# Patient Record
Sex: FEMALE | Race: WHITE | NOT HISPANIC OR LATINO | URBAN - METROPOLITAN AREA
[De-identification: names, ages, dates, MRNs, and addresses within clinical notes are randomized per-mention and may not be internally consistent; named-entity substitution may affect disease eponyms.]

---

## 2017-03-30 ENCOUNTER — RECORDS - HEALTHEAST (OUTPATIENT)
Dept: LAB | Facility: CLINIC | Age: 37
End: 2017-03-30

## 2017-03-30 LAB
CHOLEST SERPL-MCNC: 151 MG/DL
FASTING STATUS PATIENT QL REPORTED: ABNORMAL
HDLC SERPL-MCNC: 48 MG/DL
LDLC SERPL CALC-MCNC: 91 MG/DL
TRIGL SERPL-MCNC: 58 MG/DL

## 2017-12-21 ENCOUNTER — RECORDS - HEALTHEAST (OUTPATIENT)
Dept: GENERAL RADIOLOGY | Facility: CLINIC | Age: 37
End: 2017-12-21

## 2017-12-21 ENCOUNTER — OFFICE VISIT - HEALTHEAST (OUTPATIENT)
Dept: RHEUMATOLOGY | Facility: CLINIC | Age: 37
End: 2017-12-21

## 2017-12-21 DIAGNOSIS — M25.511 PAIN IN RIGHT SHOULDER: ICD-10-CM

## 2017-12-21 DIAGNOSIS — G89.29 OTHER CHRONIC PAIN: ICD-10-CM

## 2017-12-21 DIAGNOSIS — R07.89 MUSCULOSKELETAL CHEST PAIN: ICD-10-CM

## 2017-12-21 DIAGNOSIS — Z87.2 HISTORY OF PSORIASIS: ICD-10-CM

## 2017-12-21 DIAGNOSIS — M25.651 HIP STIFFNESS, RIGHT: ICD-10-CM

## 2017-12-21 DIAGNOSIS — M54.5 CHRONIC BILATERAL LOW BACK PAIN, WITH SCIATICA PRESENCE UNSPECIFIED: ICD-10-CM

## 2017-12-21 DIAGNOSIS — R07.89 OTHER CHEST PAIN: ICD-10-CM

## 2017-12-21 DIAGNOSIS — M46.1 SACROILIAC INFLAMMATION (H): ICD-10-CM

## 2017-12-21 DIAGNOSIS — M54.50 LOW BACK PAIN: ICD-10-CM

## 2017-12-21 DIAGNOSIS — G89.29 CHRONIC BILATERAL LOW BACK PAIN, WITH SCIATICA PRESENCE UNSPECIFIED: ICD-10-CM

## 2017-12-21 DIAGNOSIS — M25.511 PAIN OF RIGHT STERNOCLAVICULAR JOINT: ICD-10-CM

## 2017-12-21 DIAGNOSIS — H04.123 DRY EYES, BILATERAL: ICD-10-CM

## 2017-12-21 RX ORDER — IBUPROFEN 200 MG
200 TABLET ORAL EVERY 6 HOURS PRN
Status: SHIPPED | COMMUNITY
Start: 2017-12-21

## 2017-12-21 RX ORDER — ACETAMINOPHEN 325 MG/1
650 TABLET ORAL EVERY 6 HOURS PRN
Status: SHIPPED | COMMUNITY
Start: 2017-12-21

## 2017-12-21 ASSESSMENT — MIFFLIN-ST. JEOR: SCORE: 1221.07

## 2017-12-26 ENCOUNTER — AMBULATORY - HEALTHEAST (OUTPATIENT)
Dept: LAB | Facility: CLINIC | Age: 37
End: 2017-12-26

## 2017-12-26 DIAGNOSIS — M25.511 PAIN OF RIGHT STERNOCLAVICULAR JOINT: ICD-10-CM

## 2017-12-26 DIAGNOSIS — Z87.2 HISTORY OF PSORIASIS: ICD-10-CM

## 2017-12-26 DIAGNOSIS — M54.5 CHRONIC BILATERAL LOW BACK PAIN, WITH SCIATICA PRESENCE UNSPECIFIED: ICD-10-CM

## 2017-12-26 DIAGNOSIS — G89.29 CHRONIC BILATERAL LOW BACK PAIN, WITH SCIATICA PRESENCE UNSPECIFIED: ICD-10-CM

## 2017-12-26 DIAGNOSIS — R89.9 ABNORMAL LABORATORY TEST RESULT: ICD-10-CM

## 2017-12-26 DIAGNOSIS — M46.1 SACROILIAC INFLAMMATION (H): ICD-10-CM

## 2017-12-26 DIAGNOSIS — H04.123 DRY EYES, BILATERAL: ICD-10-CM

## 2017-12-26 DIAGNOSIS — R07.89 MUSCULOSKELETAL CHEST PAIN: ICD-10-CM

## 2017-12-26 DIAGNOSIS — M25.651 HIP STIFFNESS, RIGHT: ICD-10-CM

## 2017-12-26 LAB
ALT SERPL W P-5'-P-CCNC: 22 U/L (ref 0–45)
AST SERPL W P-5'-P-CCNC: 22 U/L (ref 0–40)
CREAT SERPL-MCNC: 0.76 MG/DL (ref 0.6–1.1)
GFR SERPL CREATININE-BSD FRML MDRD: >60 ML/MIN/1.73M2

## 2017-12-27 LAB
HCV AB SERPL QL IA: NEGATIVE
HEPATITIS B SURFACE ANTIBODY LHE- HISTORICAL: POSITIVE

## 2017-12-28 LAB
ANA SER QL: 0.7 U
HLA-B27 RESULT - HISTORICAL: NEGATIVE
INTERPRETATION: NORMAL

## 2017-12-29 ENCOUNTER — COMMUNICATION - HEALTHEAST (OUTPATIENT)
Dept: FAMILY MEDICINE | Facility: CLINIC | Age: 37
End: 2017-12-29

## 2017-12-30 LAB — HBV SURFACE AG SERPL QL IA: NEGATIVE

## 2018-01-01 LAB — HBV CORE AB SERPL QL IA: NEGATIVE

## 2018-01-02 ENCOUNTER — COMMUNICATION - HEALTHEAST (OUTPATIENT)
Dept: RHEUMATOLOGY | Facility: CLINIC | Age: 38
End: 2018-01-02

## 2018-01-02 LAB
SS-A/RO AUTOANTIBODIES - HISTORICAL: 3 EU
SS-B/LA AUTOANTIBODIES - HISTORICAL: 0 EU

## 2018-01-04 ENCOUNTER — OFFICE VISIT - HEALTHEAST (OUTPATIENT)
Dept: RHEUMATOLOGY | Facility: CLINIC | Age: 38
End: 2018-01-04

## 2018-01-04 ENCOUNTER — RECORDS - HEALTHEAST (OUTPATIENT)
Dept: GENERAL RADIOLOGY | Facility: CLINIC | Age: 38
End: 2018-01-04

## 2018-01-04 DIAGNOSIS — L40.50 PSORIATIC ARTHRITIS (H): ICD-10-CM

## 2018-01-04 DIAGNOSIS — L40.50 ARTHROPATHIC PSORIASIS, UNSPECIFIED (H): ICD-10-CM

## 2018-01-04 DIAGNOSIS — M54.2 CERVICALGIA: ICD-10-CM

## 2018-01-04 DIAGNOSIS — M25.511 PAIN OF RIGHT STERNOCLAVICULAR JOINT: ICD-10-CM

## 2018-01-04 DIAGNOSIS — G89.29 OTHER CHRONIC PAIN: ICD-10-CM

## 2018-01-04 DIAGNOSIS — M54.2 CHRONIC NECK PAIN: ICD-10-CM

## 2018-01-04 DIAGNOSIS — G89.29 CHRONIC NECK PAIN: ICD-10-CM

## 2018-01-04 DIAGNOSIS — M46.1 BILATERAL SACROILIITIS (H): ICD-10-CM

## 2018-01-04 DIAGNOSIS — Z87.2 HISTORY OF PSORIASIS: ICD-10-CM

## 2018-01-04 ASSESSMENT — MIFFLIN-ST. JEOR: SCORE: 1239.21

## 2018-01-10 ENCOUNTER — COMMUNICATION - HEALTHEAST (OUTPATIENT)
Dept: ADMINISTRATIVE | Facility: CLINIC | Age: 38
End: 2018-01-10

## 2018-01-10 ENCOUNTER — COMMUNICATION - HEALTHEAST (OUTPATIENT)
Dept: RHEUMATOLOGY | Facility: CLINIC | Age: 38
End: 2018-01-10

## 2018-01-10 DIAGNOSIS — R93.89 ABNORMAL CHEST X-RAY: ICD-10-CM

## 2018-01-10 DIAGNOSIS — M47.819 SPONDYLOARTHROPATHY: ICD-10-CM

## 2018-01-10 DIAGNOSIS — L40.50 PSORIATIC ARTHRITIS (H): ICD-10-CM

## 2018-01-15 ENCOUNTER — COMMUNICATION - HEALTHEAST (OUTPATIENT)
Dept: RHEUMATOLOGY | Facility: CLINIC | Age: 38
End: 2018-01-15

## 2018-01-16 ENCOUNTER — COMMUNICATION - HEALTHEAST (OUTPATIENT)
Dept: ADMINISTRATIVE | Facility: CLINIC | Age: 38
End: 2018-01-16

## 2018-01-17 ENCOUNTER — COMMUNICATION - HEALTHEAST (OUTPATIENT)
Dept: RHEUMATOLOGY | Facility: CLINIC | Age: 38
End: 2018-01-17

## 2018-01-18 ENCOUNTER — COMMUNICATION - HEALTHEAST (OUTPATIENT)
Dept: RHEUMATOLOGY | Facility: CLINIC | Age: 38
End: 2018-01-18

## 2018-01-29 ENCOUNTER — COMMUNICATION - HEALTHEAST (OUTPATIENT)
Dept: ENDOCRINOLOGY | Facility: CLINIC | Age: 38
End: 2018-01-29

## 2018-01-31 ENCOUNTER — COMMUNICATION - HEALTHEAST (OUTPATIENT)
Dept: RHEUMATOLOGY | Facility: CLINIC | Age: 38
End: 2018-01-31

## 2018-01-31 DIAGNOSIS — M46.1 BILATERAL SACROILIITIS (H): ICD-10-CM

## 2018-01-31 DIAGNOSIS — R93.7 ABNORMAL X-RAY OF SPINE: ICD-10-CM

## 2018-01-31 DIAGNOSIS — Z87.39 HISTORY OF INFLAMMATORY ARTHRITIS: ICD-10-CM

## 2018-01-31 DIAGNOSIS — R93.89 ABNORMAL X-RAY: ICD-10-CM

## 2018-01-31 DIAGNOSIS — L40.50 PSORIATIC ARTHRITIS (H): ICD-10-CM

## 2018-02-01 ENCOUNTER — AMBULATORY - HEALTHEAST (OUTPATIENT)
Dept: SCHEDULING | Facility: CLINIC | Age: 38
End: 2018-02-01

## 2018-02-01 DIAGNOSIS — M46.1 BILATERAL SACROILIITIS (H): ICD-10-CM

## 2018-02-01 DIAGNOSIS — L40.50 PSORIATIC ARTHRITIS (H): ICD-10-CM

## 2018-02-01 DIAGNOSIS — R93.7 ABNORMAL X-RAY OF SPINE: ICD-10-CM

## 2018-02-07 ENCOUNTER — RECORDS - HEALTHEAST (OUTPATIENT)
Dept: ADMINISTRATIVE | Facility: OTHER | Age: 38
End: 2018-02-07

## 2018-02-07 ENCOUNTER — COMMUNICATION - HEALTHEAST (OUTPATIENT)
Dept: ADMINISTRATIVE | Facility: CLINIC | Age: 38
End: 2018-02-07

## 2018-02-07 ENCOUNTER — COMMUNICATION - HEALTHEAST (OUTPATIENT)
Dept: RHEUMATOLOGY | Facility: CLINIC | Age: 38
End: 2018-02-07

## 2018-02-07 DIAGNOSIS — R93.7 ABNORMAL MRI, THORACIC SPINE: ICD-10-CM

## 2018-02-08 ENCOUNTER — COMMUNICATION - HEALTHEAST (OUTPATIENT)
Dept: RHEUMATOLOGY | Facility: CLINIC | Age: 38
End: 2018-02-08

## 2018-02-09 ENCOUNTER — RECORDS - HEALTHEAST (OUTPATIENT)
Dept: ADMINISTRATIVE | Facility: OTHER | Age: 38
End: 2018-02-09

## 2018-02-13 ENCOUNTER — COMMUNICATION - HEALTHEAST (OUTPATIENT)
Dept: RHEUMATOLOGY | Facility: CLINIC | Age: 38
End: 2018-02-13

## 2018-02-13 DIAGNOSIS — R93.7 ABNORMAL MRI, THORACIC SPINE: ICD-10-CM

## 2018-02-16 ENCOUNTER — HOSPITAL ENCOUNTER (OUTPATIENT)
Dept: NUCLEAR MEDICINE | Facility: HOSPITAL | Age: 38
Discharge: HOME | End: 2018-02-16
Attending: INTERNAL MEDICINE

## 2018-02-16 DIAGNOSIS — R93.7 ABNORMAL MRI, THORACIC SPINE: ICD-10-CM

## 2018-02-19 ENCOUNTER — COMMUNICATION - HEALTHEAST (OUTPATIENT)
Dept: ADMINISTRATIVE | Facility: CLINIC | Age: 38
End: 2018-02-19

## 2018-05-29 ENCOUNTER — COMMUNICATION - HEALTHEAST (OUTPATIENT)
Dept: RHEUMATOLOGY | Facility: CLINIC | Age: 38
End: 2018-05-29

## 2018-06-04 ENCOUNTER — OFFICE VISIT - HEALTHEAST (OUTPATIENT)
Dept: RHEUMATOLOGY | Facility: CLINIC | Age: 38
End: 2018-06-04

## 2018-06-04 DIAGNOSIS — L40.9 PSORIASIS: ICD-10-CM

## 2018-06-04 DIAGNOSIS — Z79.899 HIGH RISK MEDICATION USE: ICD-10-CM

## 2018-06-04 DIAGNOSIS — M46.1 SACROILIAC INFLAMMATION (H): ICD-10-CM

## 2018-06-04 DIAGNOSIS — L40.50 PSORIATIC ARTHRITIS (H): ICD-10-CM

## 2018-06-04 LAB
ALBUMIN SERPL-MCNC: 3.9 G/DL (ref 3.5–5)
ALT SERPL W P-5'-P-CCNC: 25 U/L (ref 0–45)
AST SERPL W P-5'-P-CCNC: 24 U/L (ref 0–40)
BASOPHILS # BLD AUTO: 0 THOU/UL (ref 0–0.2)
BASOPHILS NFR BLD AUTO: 1 % (ref 0–2)
CREAT SERPL-MCNC: 0.85 MG/DL (ref 0.6–1.1)
EOSINOPHIL # BLD AUTO: 0.3 THOU/UL (ref 0–0.4)
EOSINOPHIL NFR BLD AUTO: 5 % (ref 0–6)
ERYTHROCYTE [DISTWIDTH] IN BLOOD BY AUTOMATED COUNT: 11.6 % (ref 11–14.5)
GFR SERPL CREATININE-BSD FRML MDRD: >60 ML/MIN/1.73M2
HCT VFR BLD AUTO: 40.3 % (ref 35–47)
HGB BLD-MCNC: 13.5 G/DL (ref 12–16)
LYMPHOCYTES # BLD AUTO: 2.3 THOU/UL (ref 0.8–4.4)
LYMPHOCYTES NFR BLD AUTO: 32 % (ref 20–40)
MCH RBC QN AUTO: 29.8 PG (ref 27–34)
MCHC RBC AUTO-ENTMCNC: 33.5 G/DL (ref 32–36)
MCV RBC AUTO: 89 FL (ref 80–100)
MONOCYTES # BLD AUTO: 0.5 THOU/UL (ref 0–0.9)
MONOCYTES NFR BLD AUTO: 7 % (ref 2–10)
NEUTROPHILS # BLD AUTO: 4 THOU/UL (ref 2–7.7)
NEUTROPHILS NFR BLD AUTO: 56 % (ref 50–70)
PLATELET # BLD AUTO: 242 THOU/UL (ref 140–440)
PMV BLD AUTO: 7.6 FL (ref 7–10)
RBC # BLD AUTO: 4.53 MILL/UL (ref 3.8–5.4)
WBC: 7.2 THOU/UL (ref 4–11)

## 2018-06-04 RX ORDER — MAGNESIUM 30 MG
30 TABLET ORAL 2 TIMES DAILY
Status: SHIPPED | COMMUNITY
Start: 2018-06-04

## 2018-06-04 ASSESSMENT — MIFFLIN-ST. JEOR: SCORE: 1230.14

## 2018-06-18 ENCOUNTER — COMMUNICATION - HEALTHEAST (OUTPATIENT)
Dept: RHEUMATOLOGY | Facility: CLINIC | Age: 38
End: 2018-06-18

## 2018-06-21 ENCOUNTER — COMMUNICATION - HEALTHEAST (OUTPATIENT)
Dept: ADMINISTRATIVE | Facility: CLINIC | Age: 38
End: 2018-06-21

## 2018-10-04 ENCOUNTER — COMMUNICATION - HEALTHEAST (OUTPATIENT)
Dept: TELEHEALTH | Facility: CLINIC | Age: 38
End: 2018-10-04

## 2018-10-05 ENCOUNTER — COMMUNICATION - HEALTHEAST (OUTPATIENT)
Dept: RHEUMATOLOGY | Facility: CLINIC | Age: 38
End: 2018-10-05

## 2018-10-05 DIAGNOSIS — L40.50 PSORIATIC ARTHRITIS (H): ICD-10-CM

## 2018-10-05 DIAGNOSIS — L40.9 PSORIASIS: ICD-10-CM

## 2018-10-05 DIAGNOSIS — M46.1 SACROILIAC INFLAMMATION (H): ICD-10-CM

## 2018-11-05 ENCOUNTER — COMMUNICATION - HEALTHEAST (OUTPATIENT)
Dept: RHEUMATOLOGY | Facility: CLINIC | Age: 38
End: 2018-11-05

## 2018-11-05 DIAGNOSIS — L40.9 PSORIASIS: ICD-10-CM

## 2018-11-05 DIAGNOSIS — M46.1 SACROILIAC INFLAMMATION (H): ICD-10-CM

## 2018-11-05 DIAGNOSIS — L40.50 PSORIATIC ARTHRITIS (H): ICD-10-CM

## 2018-11-09 ENCOUNTER — COMMUNICATION - HEALTHEAST (OUTPATIENT)
Dept: ADMINISTRATIVE | Facility: CLINIC | Age: 38
End: 2018-11-09

## 2018-12-04 ENCOUNTER — COMMUNICATION - HEALTHEAST (OUTPATIENT)
Dept: ADMINISTRATIVE | Facility: CLINIC | Age: 38
End: 2018-12-04

## 2018-12-17 ENCOUNTER — COMMUNICATION - HEALTHEAST (OUTPATIENT)
Dept: ADMINISTRATIVE | Facility: CLINIC | Age: 38
End: 2018-12-17

## 2018-12-18 ENCOUNTER — COMMUNICATION - HEALTHEAST (OUTPATIENT)
Dept: ADMINISTRATIVE | Facility: CLINIC | Age: 38
End: 2018-12-18

## 2018-12-18 DIAGNOSIS — M46.1 SACROILIAC INFLAMMATION (H): ICD-10-CM

## 2018-12-18 DIAGNOSIS — L40.9 PSORIASIS: ICD-10-CM

## 2018-12-18 DIAGNOSIS — L40.50 PSORIATIC ARTHRITIS (H): ICD-10-CM

## 2018-12-19 ENCOUNTER — COMMUNICATION - HEALTHEAST (OUTPATIENT)
Dept: RHEUMATOLOGY | Facility: CLINIC | Age: 38
End: 2018-12-19

## 2019-01-14 ENCOUNTER — AMBULATORY - HEALTHEAST (OUTPATIENT)
Dept: LAB | Facility: CLINIC | Age: 39
End: 2019-01-14

## 2019-01-14 DIAGNOSIS — L40.50 PSORIATIC ARTHRITIS (H): ICD-10-CM

## 2019-01-14 DIAGNOSIS — Z79.899 HIGH RISK MEDICATION USE: ICD-10-CM

## 2019-01-14 LAB
ALBUMIN SERPL-MCNC: 3.8 G/DL (ref 3.5–5)
ALT SERPL W P-5'-P-CCNC: 13 U/L (ref 0–45)
AST SERPL W P-5'-P-CCNC: 19 U/L (ref 0–40)
CREAT SERPL-MCNC: 0.84 MG/DL (ref 0.6–1.1)
ERYTHROCYTE [DISTWIDTH] IN BLOOD BY AUTOMATED COUNT: 10.9 % (ref 11–14.5)
GFR SERPL CREATININE-BSD FRML MDRD: >60 ML/MIN/1.73M2
HCT VFR BLD AUTO: 40.9 % (ref 35–47)
HGB BLD-MCNC: 13.8 G/DL (ref 12–16)
MCH RBC QN AUTO: 30.4 PG (ref 27–34)
MCHC RBC AUTO-ENTMCNC: 33.7 G/DL (ref 32–36)
MCV RBC AUTO: 90 FL (ref 80–100)
PLATELET # BLD AUTO: 204 THOU/UL (ref 140–440)
PMV BLD AUTO: 8.3 FL (ref 7–10)
RBC # BLD AUTO: 4.53 MILL/UL (ref 3.8–5.4)
WBC: 6 THOU/UL (ref 4–11)

## 2019-01-17 ENCOUNTER — OFFICE VISIT - HEALTHEAST (OUTPATIENT)
Dept: RHEUMATOLOGY | Facility: CLINIC | Age: 39
End: 2019-01-17

## 2019-01-17 DIAGNOSIS — M46.1 BILATERAL SACROILIITIS (H): ICD-10-CM

## 2019-01-17 DIAGNOSIS — L40.9 PSORIASIS: ICD-10-CM

## 2019-01-18 ENCOUNTER — COMMUNICATION - HEALTHEAST (OUTPATIENT)
Dept: RHEUMATOLOGY | Facility: CLINIC | Age: 39
End: 2019-01-18

## 2019-01-29 ENCOUNTER — COMMUNICATION - HEALTHEAST (OUTPATIENT)
Dept: RHEUMATOLOGY | Facility: CLINIC | Age: 39
End: 2019-01-29

## 2019-02-05 ENCOUNTER — COMMUNICATION - HEALTHEAST (OUTPATIENT)
Dept: ADMINISTRATIVE | Facility: CLINIC | Age: 39
End: 2019-02-05

## 2019-02-05 ENCOUNTER — COMMUNICATION - HEALTHEAST (OUTPATIENT)
Dept: RHEUMATOLOGY | Facility: CLINIC | Age: 39
End: 2019-02-05

## 2019-02-05 DIAGNOSIS — L40.50 PSORIATIC ARTHROPATHY (H): ICD-10-CM

## 2019-02-12 ENCOUNTER — COMMUNICATION - HEALTHEAST (OUTPATIENT)
Dept: RHEUMATOLOGY | Facility: CLINIC | Age: 39
End: 2019-02-12

## 2019-02-22 ENCOUNTER — COMMUNICATION - HEALTHEAST (OUTPATIENT)
Dept: ADMINISTRATIVE | Facility: CLINIC | Age: 39
End: 2019-02-22

## 2019-02-28 ENCOUNTER — COMMUNICATION - HEALTHEAST (OUTPATIENT)
Dept: SCHEDULING | Facility: CLINIC | Age: 39
End: 2019-02-28

## 2019-03-13 ENCOUNTER — AMBULATORY - HEALTHEAST (OUTPATIENT)
Dept: RHEUMATOLOGY | Facility: CLINIC | Age: 39
End: 2019-03-13

## 2019-03-13 DIAGNOSIS — L40.50 PSORIATIC ARTHROPATHY (H): ICD-10-CM

## 2019-04-10 ENCOUNTER — AMBULATORY - HEALTHEAST (OUTPATIENT)
Dept: RHEUMATOLOGY | Facility: CLINIC | Age: 39
End: 2019-04-10

## 2019-04-10 DIAGNOSIS — L40.50 PSORIATIC ARTHROPATHY (H): ICD-10-CM

## 2019-04-12 ENCOUNTER — AMBULATORY - HEALTHEAST (OUTPATIENT)
Dept: RHEUMATOLOGY | Facility: CLINIC | Age: 39
End: 2019-04-12

## 2019-04-30 ENCOUNTER — COMMUNICATION - HEALTHEAST (OUTPATIENT)
Dept: RHEUMATOLOGY | Facility: CLINIC | Age: 39
End: 2019-04-30

## 2019-04-30 DIAGNOSIS — L40.50 PSORIATIC ARTHROPATHY (H): ICD-10-CM

## 2019-05-10 ENCOUNTER — OFFICE VISIT - HEALTHEAST (OUTPATIENT)
Dept: RHEUMATOLOGY | Facility: CLINIC | Age: 39
End: 2019-05-10

## 2019-05-10 DIAGNOSIS — M54.16 LEFT LUMBAR RADICULOPATHY: ICD-10-CM

## 2019-05-10 DIAGNOSIS — L40.50 PSORIATIC ARTHROPATHY (H): ICD-10-CM

## 2019-05-10 DIAGNOSIS — L40.9 PSORIASIS: ICD-10-CM

## 2019-05-10 DIAGNOSIS — M46.1 BILATERAL SACROILIITIS (H): ICD-10-CM

## 2019-05-10 ASSESSMENT — MIFFLIN-ST. JEOR: SCORE: 1237.4

## 2019-05-30 ENCOUNTER — COMMUNICATION - HEALTHEAST (OUTPATIENT)
Dept: RHEUMATOLOGY | Facility: CLINIC | Age: 39
End: 2019-05-30

## 2019-05-30 ENCOUNTER — COMMUNICATION - HEALTHEAST (OUTPATIENT)
Dept: TELEHEALTH | Facility: CLINIC | Age: 39
End: 2019-05-30

## 2019-05-30 DIAGNOSIS — L40.50 PSORIATIC ARTHROPATHY (H): ICD-10-CM

## 2019-06-07 ENCOUNTER — COMMUNICATION - HEALTHEAST (OUTPATIENT)
Dept: RHEUMATOLOGY | Facility: CLINIC | Age: 39
End: 2019-06-07

## 2019-06-26 ENCOUNTER — COMMUNICATION - HEALTHEAST (OUTPATIENT)
Dept: RHEUMATOLOGY | Facility: CLINIC | Age: 39
End: 2019-06-26

## 2019-06-26 DIAGNOSIS — L40.50 PSORIATIC ARTHROPATHY (H): ICD-10-CM

## 2019-07-05 ENCOUNTER — AMBULATORY - HEALTHEAST (OUTPATIENT)
Dept: RHEUMATOLOGY | Facility: CLINIC | Age: 39
End: 2019-07-05

## 2019-07-05 DIAGNOSIS — L40.50 PSORIATIC ARTHROPATHY (H): ICD-10-CM

## 2019-07-15 ENCOUNTER — COMMUNICATION - HEALTHEAST (OUTPATIENT)
Dept: RHEUMATOLOGY | Facility: CLINIC | Age: 39
End: 2019-07-15

## 2019-07-15 DIAGNOSIS — M54.16 LEFT LUMBAR RADICULOPATHY: ICD-10-CM

## 2019-07-15 DIAGNOSIS — L40.50 PSORIATIC ARTHROPATHY (H): ICD-10-CM

## 2019-07-22 ENCOUNTER — COMMUNICATION - HEALTHEAST (OUTPATIENT)
Dept: RHEUMATOLOGY | Facility: CLINIC | Age: 39
End: 2019-07-22

## 2019-08-15 ENCOUNTER — COMMUNICATION - HEALTHEAST (OUTPATIENT)
Dept: RHEUMATOLOGY | Facility: CLINIC | Age: 39
End: 2019-08-15

## 2019-08-15 DIAGNOSIS — L40.50 PSORIATIC ARTHROPATHY (H): ICD-10-CM

## 2019-08-15 DIAGNOSIS — M54.16 LEFT LUMBAR RADICULOPATHY: ICD-10-CM

## 2019-08-15 DIAGNOSIS — M46.1 BILATERAL SACROILIITIS (H): ICD-10-CM

## 2019-08-20 ENCOUNTER — COMMUNICATION - HEALTHEAST (OUTPATIENT)
Dept: RHEUMATOLOGY | Facility: CLINIC | Age: 39
End: 2019-08-20

## 2019-08-30 ENCOUNTER — COMMUNICATION - HEALTHEAST (OUTPATIENT)
Dept: ADMINISTRATIVE | Facility: CLINIC | Age: 39
End: 2019-08-30

## 2019-09-11 ENCOUNTER — OFFICE VISIT - HEALTHEAST (OUTPATIENT)
Dept: RHEUMATOLOGY | Facility: CLINIC | Age: 39
End: 2019-09-11

## 2019-09-11 DIAGNOSIS — L40.9 PSORIASIS: ICD-10-CM

## 2019-09-11 DIAGNOSIS — L40.50 PSORIATIC ARTHROPATHY (H): ICD-10-CM

## 2019-09-11 DIAGNOSIS — M46.1 BILATERAL SACROILIITIS (H): ICD-10-CM

## 2019-09-11 DIAGNOSIS — M19.011 ARTHRITIS OF RIGHT STERNOCLAVICULAR JOINT: ICD-10-CM

## 2019-09-13 ENCOUNTER — HOSPITAL ENCOUNTER (OUTPATIENT)
Dept: RADIOLOGY | Facility: CLINIC | Age: 39
Discharge: HOME | End: 2019-09-13
Attending: INTERNAL MEDICINE | Admitting: RADIOLOGY

## 2019-09-13 DIAGNOSIS — M19.011 ARTHRITIS OF RIGHT STERNOCLAVICULAR JOINT: ICD-10-CM

## 2019-09-16 ENCOUNTER — COMMUNICATION - HEALTHEAST (OUTPATIENT)
Dept: RHEUMATOLOGY | Facility: CLINIC | Age: 39
End: 2019-09-16

## 2019-09-16 DIAGNOSIS — L40.50 PSORIATIC ARTHROPATHY (H): ICD-10-CM

## 2019-09-16 DIAGNOSIS — M46.1 BILATERAL SACROILIITIS (H): ICD-10-CM

## 2019-09-16 DIAGNOSIS — L40.9 PSORIASIS: ICD-10-CM

## 2019-09-18 ENCOUNTER — COMMUNICATION - HEALTHEAST (OUTPATIENT)
Dept: RHEUMATOLOGY | Facility: CLINIC | Age: 39
End: 2019-09-18

## 2019-09-19 ENCOUNTER — COMMUNICATION - HEALTHEAST (OUTPATIENT)
Dept: TELEHEALTH | Facility: CLINIC | Age: 39
End: 2019-09-19

## 2019-09-21 ENCOUNTER — COMMUNICATION - HEALTHEAST (OUTPATIENT)
Dept: RHEUMATOLOGY | Facility: CLINIC | Age: 39
End: 2019-09-21

## 2019-09-21 DIAGNOSIS — M54.16 LEFT LUMBAR RADICULOPATHY: ICD-10-CM

## 2019-09-21 DIAGNOSIS — L40.50 PSORIATIC ARTHROPATHY (H): ICD-10-CM

## 2019-09-23 RX ORDER — NAPROXEN 500 MG/1
TABLET ORAL
Qty: 60 TABLET | Refills: 1 | Status: SHIPPED | OUTPATIENT
Start: 2019-09-23

## 2019-09-27 ENCOUNTER — COMMUNICATION - HEALTHEAST (OUTPATIENT)
Dept: ADMINISTRATIVE | Facility: CLINIC | Age: 39
End: 2019-09-27

## 2019-10-19 ENCOUNTER — COMMUNICATION - HEALTHEAST (OUTPATIENT)
Dept: RHEUMATOLOGY | Facility: CLINIC | Age: 39
End: 2019-10-19

## 2019-10-19 DIAGNOSIS — L40.50 PSORIATIC ARTHROPATHY (H): ICD-10-CM

## 2019-10-21 ENCOUNTER — COMMUNICATION - HEALTHEAST (OUTPATIENT)
Dept: RHEUMATOLOGY | Facility: CLINIC | Age: 39
End: 2019-10-21

## 2019-11-05 ENCOUNTER — COMMUNICATION - HEALTHEAST (OUTPATIENT)
Dept: RHEUMATOLOGY | Facility: CLINIC | Age: 39
End: 2019-11-05

## 2019-12-12 ENCOUNTER — COMMUNICATION - HEALTHEAST (OUTPATIENT)
Dept: RHEUMATOLOGY | Facility: CLINIC | Age: 39
End: 2019-12-12

## 2019-12-13 ENCOUNTER — COMMUNICATION - HEALTHEAST (OUTPATIENT)
Dept: RHEUMATOLOGY | Facility: CLINIC | Age: 39
End: 2019-12-13

## 2019-12-13 DIAGNOSIS — M54.16 LEFT LUMBAR RADICULOPATHY: ICD-10-CM

## 2019-12-13 DIAGNOSIS — L40.50 PSORIATIC ARTHROPATHY (H): ICD-10-CM

## 2019-12-13 DIAGNOSIS — M46.1 BILATERAL SACROILIITIS (H): ICD-10-CM

## 2019-12-26 ENCOUNTER — AMBULATORY - HEALTHEAST (OUTPATIENT)
Dept: LAB | Facility: CLINIC | Age: 39
End: 2019-12-26

## 2019-12-26 DIAGNOSIS — L40.50 PSORIATIC ARTHROPATHY (H): ICD-10-CM

## 2019-12-26 LAB
ALBUMIN SERPL-MCNC: 3.9 G/DL (ref 3.5–5)
ALT SERPL W P-5'-P-CCNC: 14 U/L (ref 0–45)
CREAT SERPL-MCNC: 0.82 MG/DL (ref 0.6–1.1)
ERYTHROCYTE [DISTWIDTH] IN BLOOD BY AUTOMATED COUNT: 12 % (ref 11–14.5)
GFR SERPL CREATININE-BSD FRML MDRD: >60 ML/MIN/1.73M2
HCT VFR BLD AUTO: 40.8 % (ref 35–47)
HGB BLD-MCNC: 13.6 G/DL (ref 12–16)
MCH RBC QN AUTO: 29.6 PG (ref 27–34)
MCHC RBC AUTO-ENTMCNC: 33.4 G/DL (ref 32–36)
MCV RBC AUTO: 89 FL (ref 80–100)
PLATELET # BLD AUTO: 226 THOU/UL (ref 140–440)
PMV BLD AUTO: 8.1 FL (ref 7–10)
RBC # BLD AUTO: 4.6 MILL/UL (ref 3.8–5.4)
WBC: 7.5 THOU/UL (ref 4–11)

## 2020-02-04 ENCOUNTER — COMMUNICATION - HEALTHEAST (OUTPATIENT)
Dept: ADMINISTRATIVE | Facility: CLINIC | Age: 40
End: 2020-02-04

## 2020-03-24 ENCOUNTER — COMMUNICATION - HEALTHEAST (OUTPATIENT)
Dept: RHEUMATOLOGY | Facility: CLINIC | Age: 40
End: 2020-03-24

## 2020-03-25 ENCOUNTER — COMMUNICATION - HEALTHEAST (OUTPATIENT)
Dept: RHEUMATOLOGY | Facility: CLINIC | Age: 40
End: 2020-03-25

## 2020-05-08 ENCOUNTER — COMMUNICATION - HEALTHEAST (OUTPATIENT)
Dept: RHEUMATOLOGY | Facility: CLINIC | Age: 40
End: 2020-05-08

## 2020-05-08 DIAGNOSIS — L40.50 PSORIATIC ARTHROPATHY (H): ICD-10-CM

## 2020-05-08 DIAGNOSIS — M46.1 BILATERAL SACROILIITIS (H): ICD-10-CM

## 2020-05-08 DIAGNOSIS — L40.9 PSORIASIS: ICD-10-CM

## 2020-05-12 ENCOUNTER — COMMUNICATION - HEALTHEAST (OUTPATIENT)
Dept: RHEUMATOLOGY | Facility: CLINIC | Age: 40
End: 2020-05-12

## 2020-05-12 DIAGNOSIS — L40.9 PSORIASIS: ICD-10-CM

## 2020-05-12 DIAGNOSIS — L40.50 PSORIATIC ARTHROPATHY (H): ICD-10-CM

## 2020-05-12 DIAGNOSIS — M46.1 BILATERAL SACROILIITIS (H): ICD-10-CM

## 2020-05-13 ENCOUNTER — COMMUNICATION - HEALTHEAST (OUTPATIENT)
Dept: RHEUMATOLOGY | Facility: CLINIC | Age: 40
End: 2020-05-13

## 2020-05-20 ENCOUNTER — OFFICE VISIT - HEALTHEAST (OUTPATIENT)
Dept: RHEUMATOLOGY | Facility: CLINIC | Age: 40
End: 2020-05-20

## 2020-05-20 DIAGNOSIS — L40.50 PSORIATIC ARTHROPATHY (H): ICD-10-CM

## 2020-05-20 DIAGNOSIS — L40.9 PSORIASIS: ICD-10-CM

## 2020-05-20 DIAGNOSIS — M54.16 LEFT LUMBAR RADICULOPATHY: ICD-10-CM

## 2020-05-20 DIAGNOSIS — M46.1 BILATERAL SACROILIITIS (H): ICD-10-CM

## 2020-05-20 DIAGNOSIS — Z79.899 HIGH RISK MEDICATION USE: ICD-10-CM

## 2020-05-22 ENCOUNTER — COMMUNICATION - HEALTHEAST (OUTPATIENT)
Dept: RHEUMATOLOGY | Facility: CLINIC | Age: 40
End: 2020-05-22

## 2020-06-23 ENCOUNTER — COMMUNICATION - HEALTHEAST (OUTPATIENT)
Dept: RHEUMATOLOGY | Facility: CLINIC | Age: 40
End: 2020-06-23

## 2020-06-23 DIAGNOSIS — M46.1 BILATERAL SACROILIITIS (H): ICD-10-CM

## 2020-06-23 DIAGNOSIS — L40.50 PSORIATIC ARTHROPATHY (H): ICD-10-CM

## 2020-06-23 DIAGNOSIS — L40.9 PSORIASIS: ICD-10-CM

## 2020-07-21 ENCOUNTER — COMMUNICATION - HEALTHEAST (OUTPATIENT)
Dept: RHEUMATOLOGY | Facility: CLINIC | Age: 40
End: 2020-07-21

## 2020-07-21 DIAGNOSIS — L40.50 PSORIATIC ARTHROPATHY (H): ICD-10-CM

## 2020-07-21 DIAGNOSIS — L40.9 PSORIASIS: ICD-10-CM

## 2020-07-21 DIAGNOSIS — M46.1 BILATERAL SACROILIITIS (H): ICD-10-CM

## 2020-07-25 ENCOUNTER — COMMUNICATION - HEALTHEAST (OUTPATIENT)
Dept: RHEUMATOLOGY | Facility: CLINIC | Age: 40
End: 2020-07-25

## 2020-07-25 DIAGNOSIS — L40.9 PSORIASIS: ICD-10-CM

## 2020-07-25 DIAGNOSIS — L40.50 PSORIATIC ARTHROPATHY (H): ICD-10-CM

## 2020-07-25 DIAGNOSIS — M46.1 BILATERAL SACROILIITIS (H): ICD-10-CM

## 2020-07-27 ENCOUNTER — COMMUNICATION - HEALTHEAST (OUTPATIENT)
Dept: RHEUMATOLOGY | Facility: CLINIC | Age: 40
End: 2020-07-27

## 2020-08-05 ENCOUNTER — AMBULATORY - HEALTHEAST (OUTPATIENT)
Dept: LAB | Facility: CLINIC | Age: 40
End: 2020-08-05

## 2020-08-05 DIAGNOSIS — M46.1 BILATERAL SACROILIITIS (H): ICD-10-CM

## 2020-08-05 DIAGNOSIS — L40.50 PSORIATIC ARTHROPATHY (H): ICD-10-CM

## 2020-08-05 LAB
ALBUMIN SERPL-MCNC: 3.8 G/DL (ref 3.5–5)
ALT SERPL W P-5'-P-CCNC: 14 U/L (ref 0–45)
CREAT SERPL-MCNC: 0.84 MG/DL (ref 0.6–1.1)
ERYTHROCYTE [DISTWIDTH] IN BLOOD BY AUTOMATED COUNT: 12.5 % (ref 11–14.5)
GFR SERPL CREATININE-BSD FRML MDRD: >60 ML/MIN/1.73M2
HCT VFR BLD AUTO: 37.2 % (ref 35–47)
HGB BLD-MCNC: 12.7 G/DL (ref 12–16)
MCH RBC QN AUTO: 29.8 PG (ref 27–34)
MCHC RBC AUTO-ENTMCNC: 34.1 G/DL (ref 32–36)
MCV RBC AUTO: 88 FL (ref 80–100)
PLATELET # BLD AUTO: 212 THOU/UL (ref 140–440)
PMV BLD AUTO: 8.5 FL (ref 7–10)
RBC # BLD AUTO: 4.24 MILL/UL (ref 3.8–5.4)
WBC: 7.6 THOU/UL (ref 4–11)

## 2020-08-06 ENCOUNTER — COMMUNICATION - HEALTHEAST (OUTPATIENT)
Dept: RHEUMATOLOGY | Facility: CLINIC | Age: 40
End: 2020-08-06

## 2020-08-06 DIAGNOSIS — L40.9 PSORIASIS: ICD-10-CM

## 2020-08-06 DIAGNOSIS — L40.50 PSORIATIC ARTHROPATHY (H): ICD-10-CM

## 2020-08-06 DIAGNOSIS — M46.1 BILATERAL SACROILIITIS (H): ICD-10-CM

## 2020-08-07 ENCOUNTER — COMMUNICATION - HEALTHEAST (OUTPATIENT)
Dept: RHEUMATOLOGY | Facility: CLINIC | Age: 40
End: 2020-08-07

## 2020-12-21 LAB
HPV SOURCE: NORMAL
HUMAN PAPILLOMA VIRUS 16 DNA: NEGATIVE
HUMAN PAPILLOMA VIRUS 18 DNA: NEGATIVE
HUMAN PAPILLOMA VIRUS FINAL DIAGNOSIS: NORMAL
HUMAN PAPILLOMA VIRUS OTHER HR: NEGATIVE
SPECIMEN DESCRIPTION: NORMAL

## 2020-12-30 ENCOUNTER — RECORDS - HEALTHEAST (OUTPATIENT)
Dept: ADMINISTRATIVE | Facility: OTHER | Age: 40
End: 2020-12-30

## 2020-12-30 LAB
BKR LAB AP ABNORMAL BLEEDING: NO
BKR LAB AP BIRTH CONTROL/HORMONES: NORMAL
BKR LAB AP CERVICAL APPEARANCE: NORMAL
BKR LAB AP GYN ADEQUACY: NORMAL
BKR LAB AP GYN INTERPRETATION: NORMAL
BKR LAB AP HPV REFLEX: NORMAL
BKR LAB AP LMP: NORMAL
BKR LAB AP PATIENT STATUS: NO
BKR LAB AP PREVIOUS ABNORMAL: 2008
BKR LAB AP PREVIOUS NORMAL: NORMAL
HIGH RISK?: NO
PATH REPORT.COMMENTS IMP SPEC: NORMAL
RESULT FLAG (HE HISTORICAL CONVERSION): NORMAL

## 2021-02-02 ENCOUNTER — COMMUNICATION - HEALTHEAST (OUTPATIENT)
Dept: RHEUMATOLOGY | Facility: CLINIC | Age: 41
End: 2021-02-02

## 2021-02-02 DIAGNOSIS — M46.1 BILATERAL SACROILIITIS (H): ICD-10-CM

## 2021-02-02 DIAGNOSIS — L40.9 PSORIASIS: ICD-10-CM

## 2021-02-02 DIAGNOSIS — L40.50 PSORIATIC ARTHROPATHY (H): ICD-10-CM

## 2021-02-24 ENCOUNTER — COMMUNICATION - HEALTHEAST (OUTPATIENT)
Dept: RHEUMATOLOGY | Facility: CLINIC | Age: 41
End: 2021-02-24

## 2021-02-26 ENCOUNTER — COMMUNICATION - HEALTHEAST (OUTPATIENT)
Dept: RHEUMATOLOGY | Facility: CLINIC | Age: 41
End: 2021-02-26

## 2021-02-26 DIAGNOSIS — M46.1 BILATERAL SACROILIITIS (H): ICD-10-CM

## 2021-02-26 DIAGNOSIS — L40.50 PSORIATIC ARTHROPATHY (H): ICD-10-CM

## 2021-02-26 DIAGNOSIS — L40.9 PSORIASIS: ICD-10-CM

## 2021-03-11 ENCOUNTER — COMMUNICATION - HEALTHEAST (OUTPATIENT)
Dept: RHEUMATOLOGY | Facility: CLINIC | Age: 41
End: 2021-03-11

## 2021-03-11 ENCOUNTER — AMBULATORY - HEALTHEAST (OUTPATIENT)
Dept: LAB | Facility: CLINIC | Age: 41
End: 2021-03-11

## 2021-03-11 DIAGNOSIS — L40.50 PSORIATIC ARTHROPATHY (H): ICD-10-CM

## 2021-03-11 DIAGNOSIS — M46.1 BILATERAL SACROILIITIS (H): ICD-10-CM

## 2021-03-11 DIAGNOSIS — L40.9 PSORIASIS: ICD-10-CM

## 2021-03-11 LAB
ALBUMIN SERPL-MCNC: 3.9 G/DL (ref 3.5–5)
ALT SERPL W P-5'-P-CCNC: 14 U/L (ref 0–45)
CREAT SERPL-MCNC: 0.82 MG/DL (ref 0.6–1.1)
ERYTHROCYTE [DISTWIDTH] IN BLOOD BY AUTOMATED COUNT: 12.4 % (ref 11–14.5)
GFR SERPL CREATININE-BSD FRML MDRD: >60 ML/MIN/1.73M2
HCT VFR BLD AUTO: 39.7 % (ref 35–47)
HGB BLD-MCNC: 13.3 G/DL (ref 12–16)
MCH RBC QN AUTO: 29.9 PG (ref 27–34)
MCHC RBC AUTO-ENTMCNC: 33.5 G/DL (ref 32–36)
MCV RBC AUTO: 89 FL (ref 80–100)
PLATELET # BLD AUTO: 257 THOU/UL (ref 140–440)
PMV BLD AUTO: 10.2 FL (ref 7–10)
RBC # BLD AUTO: 4.45 MILL/UL (ref 3.8–5.4)
WBC: 9.5 THOU/UL (ref 4–11)

## 2021-03-15 ENCOUNTER — OFFICE VISIT - HEALTHEAST (OUTPATIENT)
Dept: RHEUMATOLOGY | Facility: CLINIC | Age: 41
End: 2021-03-15

## 2021-03-15 DIAGNOSIS — Z79.899 HIGH RISK MEDICATION USE: ICD-10-CM

## 2021-03-15 DIAGNOSIS — M46.1 BILATERAL SACROILIITIS (H): ICD-10-CM

## 2021-03-15 DIAGNOSIS — L40.9 PSORIASIS: ICD-10-CM

## 2021-03-15 DIAGNOSIS — L40.50 PSORIATIC ARTHROPATHY (H): ICD-10-CM

## 2021-03-15 RX ORDER — ADALIMUMAB 40MG/0.4ML
40 KIT SUBCUTANEOUS
Qty: 1 KIT | Refills: 5 | Status: SHIPPED | OUTPATIENT
Start: 2021-03-15 | End: 2021-10-15

## 2021-04-16 ENCOUNTER — RECORDS - HEALTHEAST (OUTPATIENT)
Dept: ADMINISTRATIVE | Facility: OTHER | Age: 41
End: 2021-04-16

## 2021-05-27 NOTE — PROGRESS NOTES
Pt in for Stelara injection. Pt supplied medication. Pt reports her last injection went well, her psoriasis is less red and less inflamed, however her joint are more sore now than when she was on humira.

## 2021-05-28 NOTE — PROGRESS NOTES
ASSESSMENT AND PLAN:  Sdaa Winter 38 y.o. female is seen here on 05/10/19 for follow-up in the background of psoriatic arthritis sacroiliitis, with fusion, psoriatic arthritis, lumbar and 3 thoracic spondylitic changes.  While the Stelara has helped her psoriasis quite a bit she has low back pain.  She finds 1 pill of ibuprofen over-the-counter helps quite a bit.  The key question here is to try and decide with the pain is coming from predominantly if it is from the inflammatory or the degenerative or both etiologies.  I have reviewed the images reports done over the past  3 years.  The following plan is obtained: She is going to take Naprosyn 500 mg twice daily, Prilosec 20 mg daily, she will call back here on the phone in 2 weeks.  She will continue Naprosyn if it controls the majority of her pain.  Otherwise we might need to try ibuprofen and the anti-inflammatory does range - 2400 mg daily in divided doses.  Will meet here in the next 2 to 3 months or sooner if needed.         Diagnoses and all orders for this visit:    Bilateral sacroiliitis (H)  -     omeprazole (PRILOSEC) 20 MG capsule; Take 1 capsule (20 mg total) by mouth daily before breakfast.  Dispense: 30 capsule; Refill: 2    Psoriatic arthropathy (H)  -     naproxen (NAPROSYN) 500 MG tablet; Take 1 tablet (500 mg total) by mouth 2 (two) times a day with meals.  Dispense: 60 tablet; Refill: 1  -     omeprazole (PRILOSEC) 20 MG capsule; Take 1 capsule (20 mg total) by mouth daily before breakfast.  Dispense: 30 capsule; Refill: 2    Left lumbar radiculopathy  -     naproxen (NAPROSYN) 500 MG tablet; Take 1 tablet (500 mg total) by mouth 2 (two) times a day with meals.  Dispense: 60 tablet; Refill: 1  -     omeprazole (PRILOSEC) 20 MG capsule; Take 1 capsule (20 mg total) by mouth daily before breakfast.  Dispense: 30 capsule; Refill: 2    Psoriasis      HISTORY OF PRESENTING ILLNESS:  Sada Winter, 38 y.o., female is here for follow-up of psoriatic  "arthritis.  She is a , who reports that she has had pain in her lower back going as far back as 10 years, she feels that while she was on Humira the low back discomfort was better than it is on Stelara although psoriasis is better controlled with the latter.  She takes a tablet of Tylenol and ibuprofen over-the-counter that helps her throughout the night and the day.  She rated pain between moderately severe to severe seven-point with several day-to-day activities.  Her work-up in the past has shown evidence of sacroiliitis, and that of lumbar and thoracic spondylosis.  During the initial phase she wanted to try a \"natural\" remedies.  She then started Enbrel.  That worked very well for her it was like a \"miracle\" drug for her.  This helped psoriasis to some degree but the joints improved so much more.  By this she means pain in her neck and back.  She it appears never had any significant pain in her appendicular system.  After 3 months of taking Enbrel it stopped working as quickly as it had started originally.  She was changed over to Humira that was in June 2018.  At this once again helped her joint symptoms.  However her psoriasis has gotten worse.  She recalls that when she was in [place] last April she had may be a small patch of psoriasis on her left lower extremity distally however now she has extensive lesions on her shins bilaterally, chest left side, as well as upper extremities.  She has not had Humira since December 3 in part because of some communication issues between insurance and the clinic evidently.  She has beginning to get some discomfort back.  Her daughter observed that she was not moving as well.  She has noted no history of dactylitis.  No history of iritis or uveitis.  There is no family history of psoriasis.  Her dad's brother is said to have Crohn's.  She herself has \"bowel issues\".  She describes herself otherwise in good health.  Further historical information and ADL limitations " "as noted in the multidimensional health assessment questionnaire attached in the EMR.    ALLERGIES:Sulfa (sulfonamide antibiotics)    PAST MEDICAL/ACTIVE PROBLEMS/MEDICATION/ FAMILY HISTORY/SOCIAL DATA:  The patient has a family history of  No past medical history on file.  Social History     Tobacco Use   Smoking Status Former Smoker   Smokeless Tobacco Never Used     Patient Active Problem List   Diagnosis     Psoriatic arthropathy (H)     Left lumbar radiculopathy     Bilateral sacroiliitis (H)     Psoriasis     Current Outpatient Medications   Medication Sig Dispense Refill     acetaminophen (TYLENOL) 325 MG tablet Take 650 mg by mouth every 6 (six) hours as needed for pain.       ibuprofen (ADVIL,MOTRIN) 200 MG tablet Take 200 mg by mouth every 6 (six) hours as needed for pain.       magnesium 30 mg tablet Take 30 mg by mouth 2 (two) times a day.       ustekinumab 45 mg/0.5 mL Syrg Inject 45mg subcutaneously at week 0, 4, then inject 45mg subcutaneously once every 12 weeks 2 Syringe 1     No current facility-administered medications for this visit.      DETAILED EXAMINATION  05/10/19  :  Vitals:    05/10/19 1441   BP: 134/72   Pulse: 72   Weight: 123 lb 9.6 oz (56.1 kg)   Height: 5' 6\" (1.676 m)     Alert oriented. Head including the face is examined for malar rash, heliotropes, scarring, lupus pernio. Eyes examined for redness such as in episcleritis/scleritis, periorbital lesions.   Neck/ Face examined for parotid gland swelling, range of motion of neck.  Left upper and lower and right upper and lower extremities examined for tenderness, swelling, warmth of the appendicular joints, range of motion, edema, rash.  Some of the important findings included: Many of the psoriatic lesions have begun to heal that she still has hyperemia such as on her shins, there is no dactylitis, she does not have synovitis of palpable joints of upper extremities, knees. Her occiput to wall distance is 0..    LAB / IMAGING " DATA:  ALT   Date Value Ref Range Status   02/27/2019 20 0 - 45 U/L Final   01/14/2019 13 0 - 45 U/L Final   06/04/2018 25 0 - 45 U/L Final     Albumin   Date Value Ref Range Status   02/27/2019 4.0 3.5 - 5.0 g/dL Final   01/14/2019 3.8 3.5 - 5.0 g/dL Final   06/04/2018 3.9 3.5 - 5.0 g/dL Final     Creatinine   Date Value Ref Range Status   02/27/2019 0.83 0.60 - 1.10 mg/dL Final   01/14/2019 0.84 0.60 - 1.10 mg/dL Final   06/04/2018 0.85 0.60 - 1.10 mg/dL Final       WBC   Date Value Ref Range Status   02/27/2019 10.9 4.0 - 11.0 thou/uL Final   01/14/2019 6.0 4.0 - 11.0 thou/uL Final     Hemoglobin   Date Value Ref Range Status   02/27/2019 14.7 12.0 - 16.0 g/dL Final   01/14/2019 13.8 12.0 - 16.0 g/dL Final   06/04/2018 13.5 12.0 - 16.0 g/dL Final     Platelets   Date Value Ref Range Status   02/27/2019 260 140 - 440 thou/uL Final   01/14/2019 204 140 - 440 thou/uL Final   06/04/2018 242 140 - 440 thou/uL Final       Lab Results   Component Value Date    RF <15.0 12/26/2017    SEDRATE 16 12/26/2017

## 2021-05-29 NOTE — TELEPHONE ENCOUNTER
I called LM for the pt to c/b to see if she is planning to come to her 7/5 appt and if so we have to reschedule to 7/3 or the following week.

## 2021-05-29 NOTE — TELEPHONE ENCOUNTER
----- Message from Chelita Carbone sent at 5/30/2019 11:30 AM CDT -----  Can you send me a rx for Otezla starter dose to send along with the maintenance dose?

## 2021-05-31 ENCOUNTER — RECORDS - HEALTHEAST (OUTPATIENT)
Dept: ADMINISTRATIVE | Facility: CLINIC | Age: 41
End: 2021-05-31

## 2021-05-31 VITALS — WEIGHT: 120 LBS | BODY MASS INDEX: 19.29 KG/M2 | HEIGHT: 66 IN

## 2021-05-31 VITALS — WEIGHT: 124 LBS | HEIGHT: 66 IN | BODY MASS INDEX: 19.93 KG/M2

## 2021-05-31 NOTE — TELEPHONE ENCOUNTER
Central PA team  [phone]  Pool: HE PA MED ([id number])          PA has been initiated.       PA form completed and faxed insurance via Cover My Meds     Key:  [id number] - PA Case ID: [id number]     Medication:  Omeprazole 20MG dr capsules    Insurance:  [facility]        Response will be received via fax and may take up to 5-10 business days depending on plan

## 2021-06-01 ENCOUNTER — RECORDS - HEALTHEAST (OUTPATIENT)
Dept: ADMINISTRATIVE | Facility: CLINIC | Age: 41
End: 2021-06-01

## 2021-06-01 VITALS — HEIGHT: 66 IN | WEIGHT: 122 LBS | BODY MASS INDEX: 19.61 KG/M2

## 2021-06-01 NOTE — TELEPHONE ENCOUNTER
matthew    In regards of: adalimumab (HUMIRA,CF, PEN) 40 mg/0.4 mL PnKt    Needing rx for starter kit and questions on if pt to discontinue otezla.    [facility] [address]    Please call them back @ [phone]

## 2021-06-01 NOTE — TELEPHONE ENCOUNTER
[facility] pharmacy notified that pt does not need the Humira loading dose and that she is going to continue to take Otezla along with Humira.

## 2021-06-02 VITALS — WEIGHT: 123.6 LBS | HEIGHT: 66 IN | BODY MASS INDEX: 19.86 KG/M2

## 2021-06-02 VITALS — BODY MASS INDEX: 20.01 KG/M2 | WEIGHT: 124 LBS

## 2021-06-03 VITALS
BODY MASS INDEX: 20.18 KG/M2 | DIASTOLIC BLOOD PRESSURE: 60 MMHG | HEART RATE: 68 BPM | SYSTOLIC BLOOD PRESSURE: 112 MMHG | WEIGHT: 125 LBS

## 2021-06-05 NOTE — TELEPHONE ENCOUNTER
Juliao is calling to clarify that this patient is on Humira and Otezla.      Phone: [phone]  Ref #:  [id number]

## 2021-06-08 NOTE — PROGRESS NOTES
"Sada Winter is a 39 y.o. female who is being evaluated via a billable video visit.      The patient has been notified of following:     \"This video visit will be conducted via a call between you and your physician/provider. We have found that certain health care needs can be provided without the need for an in-person physical exam.  This service lets us provide the care you need with a video conversation.  If a prescription is necessary we can send it directly to your pharmacy.  If lab work is needed we can place an order for that and you can then stop by our lab to have the test done at a later time.    Video visits are billed at different rates depending on your insurance coverage. Please reach out to your insurance provider with any questions.    If during the course of the call the physician/provider feels a video visit is not appropriate, you will not be charged for this service.\"    Patient has given verbal consent to a Video visit? Yes    Patient would like to receive their AVS by AVS Preference: Lefty.    Patient would like the video invitation sent by: Text to cell phone: [phone]    Will anyone else be joining your video visit? No            Video-Visit Details    Type of service:  Video Visit    Originating Location (pt. Location): Home    Distant Location (provider location):  [facility]     [facility]        ASSESSMENT AND PLAN:    Diagnoses and all orders for this visit:    Psoriatic arthropathy (H)    Bilateral sacroiliitis (H)    Psoriasis    Left lumbar radiculopathy    High risk medication use          HISTORY OF PRESENTING ILLNESS:  Sada Winter 39 y.o. is evaluated here via video link.  For follow-up of psoriatic arthritis, sacroiliitis with ankylosis, and lumbar spondylosis.  She has significant psoriasis.  On her previous visit she had indicated that with Otezla and Stelara her skin symptoms are very well controlled but joints were troubling her.  " She talked with her dermatologist and has since changed Humira as in the past to try to control her arthritis symptoms better.  She took this with Otezla till December.  Did not think Otezla was doing much for her discontinue that.  Her psoriasis is troublesome.  The worst is on the left olecranon area.  She is due to be seen in dermatology shortly.    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  We will meet here in 6months or sooner if her joint symptoms require such.    ROS enquiry held for fever, ocular symptoms, rash, headache,  GI issues.  Today we also discussed the issues related to the current pandemic, the pros and cons of the current treatment plan, the CDC guidelines such as social distancing washing the hands covering the cough.  ALLERGIES:Sulfa (sulfonamide antibiotics)    PAST MEDICAL/ACTIVE PROBLEMS/MEDICATION/SOCIAL DATA  No past medical history on file.  Social History     Tobacco Use   Smoking Status Former Smoker   Smokeless Tobacco Never Used     Patient Active Problem List   Diagnosis     Psoriatic arthropathy (H)     Left lumbar radiculopathy     Bilateral sacroiliitis (H)     Psoriasis     Current Outpatient Medications   Medication Sig Dispense Refill     acetaminophen (TYLENOL) 325 MG tablet Take 650 mg by mouth every 6 (six) hours as needed for pain.       HUMIRA,CF, PEN 40 mg/0.4 mL PnKt INJECT 40 MG UNDER THE SKIN EVERY 14 DAYS 1 kit 0     ibuprofen (ADVIL,MOTRIN) 200 MG tablet Take 200 mg by mouth every  6 (six) hours as needed for pain.       magnesium 30 mg tablet Take 30 mg by mouth 2 (two) times a day.       naproxen (NAPROSYN) 500 MG tablet TAKE 1 TABLET BY MOUTH TWICE A DAY WITH MEALS 60 tablet 1     No current facility-administered medications for this visit.          EXAMINATION:    Using the audio and video link as best as possible the constitutional, neck, neurologic, psych, skin, both upper extremities areas/organ system were evaluated during this assessment.  Some of the important findings: Left olecranon area psoriasis lesion.  She does not have dactylitis of the digits able to make full fist, full abduction in both shoulders.      LAB / IMAGING DATA:  ALT   Date Value Ref Range Status   12/26/2019 14 0 - 45 U/L Final   02/27/2019 20 0 - 45 U/L Final   01/14/2019 13 0 - 45 U/L Final     Albumin   Date Value Ref Range Status   12/26/2019 3.9 3.5 - 5.0 g/dL Final   02/27/2019 4.0 3.5 - 5.0 g/dL Final   01/14/2019 3.8 3.5 - 5.0 g/dL Final     Creatinine   Date Value Ref Range Status   12/26/2019 0.82 0.60 - 1.10 mg/dL Final   02/27/2019 0.83 0.60 - 1.10 mg/dL Final   01/14/2019 0.84 0.60 - 1.10 mg/dL Final       WBC   Date Value Ref Range Status   12/26/2019 7.5 4.0 - 11.0 thou/uL Final   02/27/2019 10.9 4.0 - 11.0 thou/uL Final     Hemoglobin   Date Value Ref Range Status   12/26/2019 13.6 12.0 - 16.0 g/dL Final   02/27/2019 14.7 12.0 - 16.0 g/dL Final   01/14/2019 13.8 12.0 - 16.0 g/dL Final     Platelets   Date Value Ref Range Status   12/26/2019 226 140 - 440 thou/uL Final   02/27/2019 260 140 - 440 thou/uL Final   01/14/2019 204 140 - 440 thou/uL Final       Lab Results   Component Value Date    RF <15.0 12/26/2017    SEDRATE 16 12/26/2017     Duration of the call:10  Minutes  Call start: 118  pm  Call end:   129pm

## 2021-06-09 NOTE — TELEPHONE ENCOUNTER
7/17-LMTCB x4         7/10-LMTCB x3         7/2-LMTCB x1         6/25-Patient will call back to schedule.

## 2021-06-14 NOTE — PROGRESS NOTES
"Sada Winter who presents today with a chief complaint of  Arthritis and Consult, joint pains    Joint Pains: Yes  Location: r shoulder, sternum, low back  Onset: chronic, 10 yrs for low back, r shoulder and sternum 3-4 yrs  Intensity:  7-10/10  AM Stiffness: 40 Minutes  Alleviating/Aggravating Factors: unknown triggers to patient  Tolerating Meds: Yes  Other: Aleve not helpful. Ibuprofen 200 mg provides some relief      ROS:  Patient has some dry eyes, no: dry mouth, oral ulcers, alopecia, rashes, photosensitivity, + history of psoriasis, no: chest pain, shortness of breath, cough, dysuria, history of kidney stones, abdominal pain, diarrhea, hematochezia, dysphagia, history of peptic ulcer disease, history of HIV, tuberculosis, hepatitis B or C, Lyme disease, seizure history, raynaud's, fevers, recent infections, difficulty sleeping, involuntary weight loss, [low back stiffness, + fatigue, no: depression, anxiety, no: loss of appetite, numbness and tingling, headaches, sinusitis,  double vision, loss of vision or painful vision.      Problem List:  There is no problem list on file for this patient.       PMH:   No past medical history on file.    Surgical History:  No past surgical history on file.    Family History:  No family history on file.    Social History:   reports that she has quit smoking. She has never used smokeless tobacco.    Allergies:  Allergies   Allergen Reactions     Sulfa (Sulfonamide Antibiotics) Unknown        Current Medications:  Current Outpatient Prescriptions   Medication Sig Dispense Refill     acetaminophen (TYLENOL) 325 MG tablet Take 650 mg by mouth every 6 (six) hours as needed for pain.       ibuprofen (ADVIL,MOTRIN) 200 MG tablet Take 200 mg by mouth every 6 (six) hours as needed for pain.       No current facility-administered medications for this visit.            Physical Exam:  /82  Pulse 68  Ht 5' 6\" (1.676 m)  Wt 120 lb (54.4 kg)  Breastfeeding? No  BMI 19.37 " kg/m2  General: A & O x 3 in NAD  HEENT: EOMI, Non injected/non icteric sclera, no oral lesions noted  Neck: Supple, no cervical LAD or thyromegaly noted.  Questionable psoriatic plaque involving distal component of left lower extremity adjacent to ankle joint.  Derm: No malar rash, psoriatic lesions or nail pitting appreciated  CV: s1s2 with RRR, no rubs appreciated   Lungs: CTA B/L, no wheezing , rales or rhonci appreciated  GI: Soft, NT/ND, no rebound, no guarding noted, no hepatomegally appreciated  MS: Positive reproducible discomfort involving right sternoclavicular joint region on palpation and with active range of motion testing of her right shoulder.  Positive reproducible focal discomfort on palpating left side of sternomanubrial junction (focal area of pain pointed out by patient).   Some tightness noted involving right hip on passive range of motion testing compared to left.  Insignificant amount of myofascial tender points on exam.  Otherwise patient demonstrated good passive/active ROM over other joints with no warmth, erythema, tenderness or synovitis noted over these joints.  Back: negative straight leg raising, good heel/occiput wall testing, 3 cm expansion on Schober's testing.  Neuro: 5/5 strength in upper and lower extremities b/l, good sensation b/l, 2+ biceps/patella reflexes bilaterally    Summary/Assessment:    37-year-old female, presents with chronic low back pains, onset about 10 years ago.  States  saw a rheumatologist about 2 years ago (Dr. Walsh, health [facility]) and was told to have psoriatic arthritis.    States that her skin lesions were also diagnosed by this rheumatologist.    Patient has also been experiencing discomfort involving right sternoclavicular joint and left side of sternomanubrial junction over the past 3-4 years.    States that her skin lesions have significantly improved over the past year attributes to sun exposure.  On exam today only has isolated small patch  involving the distal left lower extremity.    Patient has tried herbal medications and chiropractic treatments for her back pains with only limited/transient benefit and therefore returns to rheumatology for possible biologic initiation.    Prior rheumatologist began prepping patient for possible Enbrel initiation, which was never started.    Patient had x-rays performed by health [facility], appreciated on care everywhere showing signs of sclerosis predominantly involving iliac side with minimal involvement of sacral side, findings being consistent with having sacroiliitis.      Demonstrated significant limitation on Schober's testing on exam.    Noted to be HLA B27 negative    Given the above, signs and symptoms may be consistent with having have a spondyloarthropathy/psoriatic arthritis.    Denies any history of inflammatory bowel disease or iritis/uveitis.    Pertinent rheumatology/past medical history (please refer to above for more detailed history):      Sacroiliitis, possibly related to psoriatic arthritis     Possible psoriasis (lately improved, involved lower extremities)    Right sternoclavicular joint pain    Left-sided sternomanubrial pain    Dry eyes      Rheumatology medications provided/suggested:          Pertinent medication from other providers or from otc (please refer to above for more detailed med list):    Ibuprofen 200 mg      Pertinent medications already tried:     Clobetasol (?, on prior note)  Tylenol    Pertinent lab history:      From outside facility: HLA B27 negative, normal ESR    Hepatitis B surface antibody positive with surface antigen and core antibody negative.      Pertinent imaging/test history:    From October 2015 x-rays performed by [facility], appreciated on care everywhere showed: signs of sclerosis predominantly involving iliac side with minimal involvement of sacral side, findings being consistent with having sacroiliitis.          Other:    Has already seen a  chiropractor.    States currently not using contraceptives however does not plan on having any additional children.  Has 2 young children ages 5 and 7.  Works as a  part-time.    Plan:      Given signs of sacroiliitis, recommended starting a DMARD or biologic to minimize progression and reduced pains/stiffness.  Patient initially interested in starting Enbrel given prior discussion with previous rheumatologist however, may be willing to start a DMARD initially.  We will first obtain x-rays and labs and correlate clinically with goal of starting treatment by next visit in a couple weeks..      In addition to obtaining x-rays of lumbar spine and SI joints, will also x-ray her right sternoclavicular joint and sternum/manubrium (  Per x-ray department, all-inclusive in sternoclavicular films).    In the interim can take ibuprofen  200 mg every 6 hours, prn.  This low-dose has been helpful however patient apprehensive to take regularly.  Made aware okay to take this dose regularly, as did not have any intolerance and we can monitor labs periodically.    If we start a DMARD, will need a baseline chest x-ray and patient will need to add contraceptive use.    Follow-up in 2 weeks      Procedure note:     Spent 70 minutes with greater than half of this time spent with the patient going over differential diagnosis, prognosis, treatment plan, medication side effects and  answering questions.    Side effect profile of medications provided/suggested were discussed with the patient.    This note was transcribed using Dragon voice recognition software as a result unintentional grammatical errors or word substitutions may have occurred. Please contact our Rheumatology department if you need any clarification or if you have any related inquiries.    Thank you for referring this patient to our clinic.      Gwyn Ma ....................  12/21/2017   3:53 PM

## 2021-06-15 NOTE — PROGRESS NOTES
"Sada Winter who presents today with a chief complaint of  Follow-up, joint pains      Joint Pains: Yes  Location: low back  Onset: chronic  Intensity:  8-10/10  AM Stiffness: 20 Minutes  Alleviating/Aggravating Factors: some improvement with ibuprofen  Tolerating Meds: yes  Other:      ROS:  Patient denies having any chest pain, shortness of breath, cough, abdominal pain, nausea, vomiting, rashes, fevers, oral ulcers and recent infections.  Patient admits to having a good appetite      Problem List:  There is no problem list on file for this patient.       PMH:   No past medical history on file.    Surgical History:  No past surgical history on file.    Family History:  No family history on file.    Social History:   reports that she has quit smoking. She has never used smokeless tobacco.    Allergies:  Allergies   Allergen Reactions     Sulfa (Sulfonamide Antibiotics) Unknown        Current Medications:  Current Outpatient Prescriptions   Medication Sig Dispense Refill     acetaminophen (TYLENOL) 325 MG tablet Take 650 mg by mouth every 6 (six) hours as needed for pain.       ibuprofen (ADVIL,MOTRIN) 200 MG tablet Take 200 mg by mouth every 6 (six) hours as needed for pain.       No current facility-administered medications for this visit.            Physical Exam:  /60  Pulse 70  Ht 5' 6\" (1.676 m)  Wt 124 lb (56.2 kg)  Breastfeeding? No  BMI 20.01 kg/m2  General: A & O x 3 in NAD  HEENT: EOMI, Non injected/non icteric sclera, no oral lesions noted  Dermatology: Punctate scaly lesions on erythematous base noted involving right side of trunk.  Silvery scaly lesions noted involving posterior scalp.  CV: s1s2 with RRR, no rubs appreciated   Lungs: CTA B/L, no wheezing , rales or rhonci appreciated  GI: Soft, NT/ND, no rebound, no guarding noted  MS: Some limitation noted on C-spine rotation bilaterally.  Palpating sternum/manubrium did not reproduce any pains today.  Palpating vertebral/paravertebral " lumbar spine regions and SI joint regions did not reproduce any pains pain.  Negative straight leg raising bilaterally.  Otherwise patient demonstrated good passive/active ROM over other joints with no warmth, erythema, tenderness or synovitis noted over these joints.      Assessment/Plan:  Summary/Assessment:    History that includes sacroiliitis likely related to psoriatic arthritis, sternoclavicular and sternomanubrial pain.    Presents for follow-up visit.  Had repeat SI x-rays which showed signs of bilateral sacroiliitis with  ankylosis.  Based on comparing reports from x-ray about 2 years ago (from outside facility) compared to recent x-ray, some progression appreciated.  Patient has not been on any immunosuppressant medications.    Admits to having greater limitation/less mobility with increased stiffness compared to 2 years ago.    Patient is not willing to initiate a DMARD at this time.  Patient would rather start Enbrel as opposed to methotrexate.  Is aware that insurance may necessitate she first show failure to a DMARD.      Discussed at length side effect profiles of Biologics (Enbrel) and DMARD's (MTX).    Only takes ibuprofen low-dose at night with some benefit.      From prior note: Complains of chronic low back pains, onset about 10 years ago.  States  saw a rheumatologist about 2 years ago (Dr. Walsh, [facility]) and was told to have psoriatic arthritis.    Patient has also been experiencing discomfort involving right sternoclavicular joint and left side of sternomanubrial junction over the past 3-4 years.    Demonstrated significant limitation on Schober's testing on exam.    Noted to be HLA B27 negative    Denies any history of inflammatory bowel disease or iritis/uveitis.    Pertinent rheumatology/past medical history (please refer to above for more detailed history):      Sacroiliitis, possibly related to psoriatic arthritis     Possible psoriasis (lately improved, involved lower  extremities, scalp trunk, states involved extensor knee surfaces in past)    Right sternoclavicular joint pain    Left-sided sternomanubrial pain    Dry eyes      Rheumatology medications provided/suggested:    Enbrel      Pertinent medication from other providers or from otc (please refer to above for more detailed med list):    Ibuprofen 200 mg      Pertinent medications already tried:     Clobetasol (?, on prior note)  Tylenol    Pertinent lab history:      Noted to have negative/unremarkable: HLA B27 negative, LAVONNE, rheumatoid factor, CCP antibody, Sjogren antibodies, ESR, CRP.    Hepatitis B surface antibody positive with surface antigen and core antibody negative.      Pertinent imaging/test history:    From October 2015 x-rays performed by health [facility], appreciated on care everywhere showed: signs of sclerosis predominantly involving iliac side with minimal involvement of sacral side, findings being consistent with having sacroiliitis.        X-rays of sacroiliac joints from December 2017 showed: Chronic bilateral symmetric sacroiliitis with subchondral sclerosis and cystic change and broad-based areas of ankylosis involving both SI joints.    X-rays of lumbar spine show some mild facet degeneration.    X-rays of sternoclavicular joints (Per x-ray department, includes sternum and manubrium) were unremarkable.      Other:    Has already seen a chiropractor.    Stated does not plan on having any additional children.  Has 2 young children ages 5 and 7.  Works as a  part-time.      Plan:      We will prescribe Enbrel 50 mg weekly.  If unauthorized by insurance patient is okay with pursuing methotrexate.    States she will be using condoms for contraceptive use and will be discussing further with her PCP regarding best available options.  Patient made aware that if we prescribe methotrexate, it is known to cause fetal abnormalities and if decides to become pregnant she will need to hold for at least 3  months prior to initiating.    Made aware to refrain/limit alcohol beverage use.    Can take ibuprofen  200 mg every 6 hours, prn.  This low-dose has been helpful however patient apprehensive to take regularly.  Made aware okay to take this dose regularly, as did not have any intolerance and we can monitor labs periodically.    We will x-ray her cervical spine and obtain baseline chest x-ray.    Recheck labs in about 6-8 weeks after initiating Enbrel thereafter every 6 months if stable.    Follow-up in 2-3 months.      Procedure note:     Spent 60 minutes with greater than half of this time spent with the patient going over differential diagnosis, prognosis, treatment plan, medication side effects and  answering questions.    Side effect profile of medications provided/suggested were discussed with the patient.    This note was transcribed using Dragon voice recognition software as a result unintentional grammatical errors or word substitutions may have occurred. Please contact our Rheumatology department if you need any clarification or if you have any related inquiries.    Thank you for referring this patient to our clinic.      Gwyn Ma ....................  1/4/2018   11:08 AM

## 2021-06-15 NOTE — TELEPHONE ENCOUNTER
Dr Kameron ruby'd Humira refill for one month, pt needs labs and f/u within that time. Pt is aware via [employer] message.    Refilled per Rheum RN refill protocol

## 2021-06-15 NOTE — TELEPHONE ENCOUNTER
----- Message from MARIAH Russ sent at 2/24/2021  8:14 AM CST -----  Regarding: Unable to reach pt to schedule appts  2/24/21 [id number]. Unable to reach pt to schedule appts.  ----- Message -----  From: Brenda Hernandez RN  Sent: 2/2/2021  10:05 AM CST  To: Rheumatology Scheduling Registration Pool    Please call pt to schedule lab only appt and follow up with Dr Melo for med refills

## 2021-06-15 NOTE — PROGRESS NOTES
Sada Winter is a 40 y.o. female who is being evaluated via a billable video visit.     How would you like to obtain your AVS? MyChart.  If dropped from the video visit, the video invitation should be resent by: [phone]  Will anyone else be joining your video visit? No      Video Start Time: 10:49 AM  Video-Visit Details    Type of service:  Video Visit    Video End Time (time video stopped): 10:59 AM  Originating Location (pt. Location): Home    Distant Location (provider location):  [facility]     Platform used for Video Visit: Doximity     This document was created using a software with less than 100% fidelity, at times resulting in unintended, even erroneous syntax and grammar.  The reader is advised to keep this under consideration while reviewing, interpreting this note.           ASSESSMENT AND PLAN:    Diagnoses and all orders for this visit:    Psoriatic arthropathy (H)    Bilateral sacroiliitis (H)    Psoriasis    High risk medication use        Follow up in 6 months        Patient has given verbal consent to a Video visit? Yes    Patient would like to receive their AVS by AVS Preference: MyChart.    Patient would like the video invitation sent by: Text to cell phone: [phone]    Will anyone else be joining your video visit? No            Video-Visit Details    Type of service:  Video Visit    Originating Location (pt. Location): Home    Distant Location (provider location):  [facility]     Platform used for Video Visit: Doximity        ASSESSMENT AND PLAN:    Diagnoses and all orders for this visit:    Psoriatic arthropathy (H)  -     HUMIRA,CF, PEN 40 mg/0.4 mL PnKt; Inject 40 mg under the skin every 14 (fourteen) days.  Dispense: 1 kit; Refill: 5    Bilateral sacroiliitis (H)  -     HUMIRA,CF, PEN 40 mg/0.4 mL PnKt; Inject 40 mg under the skin every 14 (fourteen) days.  Dispense: 1 kit; Refill: 5    Psoriasis  -     HUMIRA,CF, PEN 40 mg/0.4 mL  PnKt; Inject 40 mg under the skin every 14 (fourteen) days.  Dispense: 1 kit; Refill: 5    High risk medication use          HISTORY OF PRESENTING ILLNESS:  Sada Winter 40 y.o. is evaluated here via video link.  For follow-up of psoriatic arthritis, sacroiliitis with ankylosis, and lumbar spondylosis.  She has significant psoriasis.  Having returned to Humira has helped her joint symptoms very well, virtually asymptomatic however the skin is getting worse with psoriatic involvement of the lower extremities and not torso, previously noted olecranon areas is still active, she is going to go back to her dermatologist, Stelara in the past was very effective for skin but nothing for joint symptoms, and originally Enbrel was good for both.  She has never been on methotrexate for both her reproductive reasons initially, and the concerns around the side effect profile.  We talked about other options including reconsidering methotrexate, or IL-17 inhibitors and others.  Recent labs are within acceptable range.  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  We talked about COVID-19 vaccination.  Follow-up here in 6 months with labs prior.          ROS enquiry held for fever, ocular symptoms, rash, headache,  GI issues.  Today we also discussed the issues related to the current pandemic, the pros and cons of the current treatment plan, the CDC guidelines such as social distancing washing the hands covering the cough.  ALLERGIES:Sulfa (sulfonamide antibiotics)    PAST MEDICAL/ACTIVE PROBLEMS/MEDICATION/SOCIAL DATA  No past medical history on file.  Social History     Tobacco Use   Smoking Status Former Smoker   Smokeless Tobacco Never Used     Patient Active Problem List   Diagnosis     Psoriatic arthropathy (H)     Bilateral  sacroiliitis (H)     Psoriasis     High risk medication use     Current Outpatient Medications   Medication Sig Dispense Refill     acetaminophen (TYLENOL) 325 MG tablet Take 650 mg by mouth every 6 (six) hours as needed for pain.       HUMIRA,CF, PEN 40 mg/0.4 mL PnKt Inject 40 mg under the skin every 14 (fourteen) days. 1 kit 0     ibuprofen (ADVIL,MOTRIN) 200 MG tablet Take 200 mg by mouth every 6 (six) hours as needed for pain.       magnesium 30 mg tablet Take 30 mg by mouth 2 (two) times a day.       naproxen (NAPROSYN) 500 MG tablet TAKE 1 TABLET BY MOUTH TWICE A DAY WITH MEALS 60 tablet 1     No current facility-administered medications for this visit.          EXAMINATION:      EXAMINATION:    Using the audio and video link as best as possible the constitutional, neck, neurologic, psych, skin, both upper extremities areas/organ system were evaluated during this assessment.  Some of the important findings: Alert, oriented, speech fluent.  No dactylitis of digits, olecranon areas psoriatic lesions bilaterally worse on the left side.  Able to fully flex the digits, into fists bilaterally, wrist and elbow range of motion appear normal, abduction of the shoulder is normal.      LAB / IMAGING DATA:  ALT   Date Value Ref Range Status   03/11/2021 14 0 - 45 U/L Final   08/05/2020 14 0 - 45 U/L Final   12/26/2019 14 0 - 45 U/L Final     Albumin   Date Value Ref Range Status   03/11/2021 3.9 3.5 - 5.0 g/dL Final   08/05/2020 3.8 3.5 - 5.0 g/dL Final   12/26/2019 3.9 3.5 - 5.0 g/dL Final     Creatinine   Date Value Ref Range Status   03/11/2021 0.82 0.60 - 1.10 mg/dL Final   08/05/2020 0.84 0.60 - 1.10 mg/dL Final   12/26/2019 0.82 0.60 - 1.10 mg/dL Final       WBC   Date Value Ref Range Status   03/11/2021 9.5 4.0 - 11.0 thou/uL Final   08/05/2020 7.6 4.0 - 11.0 thou/uL Final     Hemoglobin   Date Value Ref Range Status   03/11/2021 13.3 12.0 - 16.0 g/dL Final   08/05/2020 12.7 12.0 - 16.0 g/dL Final   12/26/2019  13.6 12.0 - 16.0 g/dL Final     Platelets   Date Value Ref Range Status   03/11/2021 257 140 - 440 thou/uL Final   08/05/2020 212 140 - 440 thou/uL Final   12/26/2019 226 140 - 440 thou/uL Final       Lab Results   Component Value Date    RF <15.0 12/26/2017    SEDRATE 16 12/26/2017      PAST MEDICAL/ACTIVE PROBLEMS/MEDICATION/SOCIAL DATA  No past medical history on file.  Social History     Tobacco Use   Smoking Status Former Smoker   Smokeless Tobacco Never Used     Patient Active Problem List   Diagnosis     Psoriatic arthropathy (H)     Bilateral sacroiliitis (H)     Psoriasis     High risk medication use     Current Outpatient Medications   Medication Sig Dispense Refill     acetaminophen (TYLENOL) 325 MG tablet Take 650 mg by mouth every 6 (six) hours as needed for pain.       HUMIRA,CF, PEN 40 mg/0.4 mL PnKt Inject 40 mg under the skin every 14 (fourteen) days. 1 kit 0     ibuprofen (ADVIL,MOTRIN) 200 MG tablet Take 200 mg by mouth every 6 (six) hours as needed for pain.       magnesium 30 mg tablet Take 30 mg by mouth 2 (two) times a day.       naproxen (NAPROSYN) 500 MG tablet TAKE 1 TABLET BY MOUTH TWICE A DAY WITH MEALS 60 tablet 1     No current facility-administered medications for this visit.          LAB / IMAGING DATA:  ALT   Date Value Ref Range Status   03/11/2021 14 0 - 45 U/L Final   08/05/2020 14 0 - 45 U/L Final   12/26/2019 14 0 - 45 U/L Final     Albumin   Date Value Ref Range Status   03/11/2021 3.9 3.5 - 5.0 g/dL Final   08/05/2020 3.8 3.5 - 5.0 g/dL Final   12/26/2019 3.9 3.5 - 5.0 g/dL Final     Creatinine   Date Value Ref Range Status   03/11/2021 0.82 0.60 - 1.10 mg/dL Final   08/05/2020 0.84 0.60 - 1.10 mg/dL Final   12/26/2019 0.82 0.60 - 1.10 mg/dL Final       WBC   Date Value Ref Range Status   03/11/2021 9.5 4.0 - 11.0 thou/uL Final   08/05/2020 7.6 4.0 - 11.0 thou/uL Final     Hemoglobin   Date Value Ref Range Status   03/11/2021 13.3 12.0 - 16.0 g/dL Final   08/05/2020 12.7 12.0  - 16.0 g/dL Final   12/26/2019 13.6 12.0 - 16.0 g/dL Final     Platelets   Date Value Ref Range Status   03/11/2021 257 140 - 440 thou/uL Final   08/05/2020 212 140 - 440 thou/uL Final   12/26/2019 226 140 - 440 thou/uL Final       Lab Results   Component Value Date    RF <15.0 12/26/2017    SEDRATE 16 12/26/2017

## 2021-06-16 PROBLEM — L40.50 PSORIATIC ARTHROPATHY (H): Status: ACTIVE | Noted: 2018-01-04

## 2021-06-16 PROBLEM — Z79.899 HIGH RISK MEDICATION USE: Chronic | Status: ACTIVE | Noted: 2020-05-20

## 2021-06-16 PROBLEM — L40.9 PSORIASIS: Status: ACTIVE | Noted: 2019-01-17

## 2021-06-16 PROBLEM — M46.1 BILATERAL SACROILIITIS (H): Status: ACTIVE | Noted: 2019-01-17

## 2021-06-16 NOTE — TELEPHONE ENCOUNTER
Telephone Encounter by Chelita Granda at 5/30/2019  8:35 AM     Author: Chelita Granda Service: -- Author Type: Financial Resource Guide    Filed: 5/30/2019  8:59 AM Encounter Date: 5/30/2019 Status: Signed    : Chelita Granda (Financial Resource Guide)       Medication: Otezla 30mg  QTY: 60 tabs for 30 days  Insurance: [employer]  Pharmacy: Restricted to [facility]  Additional Information: Will be staying on Stelara along with Otezla

## 2021-06-16 NOTE — TELEPHONE ENCOUNTER
Telephone Encounter by Chelita Granda at 9/19/2019  8:53 AM     Author: Chelita Granda Service: -- Author Type: Financial Resource Guide    Filed: 9/19/2019  9:13 AM Encounter Date: 9/18/2019 Status: Addendum    : Chelita Granda (Financial Resource Guide)    Related Notes: Original Note by Chelita Granda (Financial Resource Guide) filed at 9/19/2019  8:59 AM       Medication: Humira CF 40mg/ml  Qty: 2 pens for 28 days  Effective Dates: 09/18/2019 - 09/17/2021  Pharmacy: Restricted to [facility]  Copay Amount: $0.00  Copay Assistance: NA  Patient Notified? Yes via [employer]  Additional Information: Otezla PA is still good - see image below the Humira test claim

## 2021-06-16 NOTE — TELEPHONE ENCOUNTER
Telephone Encounter by Chelita Granda at 9/18/2019  8:41 AM     Author: Chelita Granda Service: -- Author Type: Financial Resource Guide    Filed: 9/18/2019 10:04 AM Encounter Date: 9/18/2019 Status: Signed    : Chelita Granda (Financial Resource Guide)       Medication: Humira CF 40mg/0.4ml  QTY: 2 pens for 28 days  Insurance: [employer]  Additional Information: will be taking along with Otezla

## 2021-06-16 NOTE — TELEPHONE ENCOUNTER
Telephone Encounter by Chelita Granda at 10/21/2019  2:12 PM     Author: Chelita Granda Service: -- Author Type: Financial Resource Guide    Filed: 10/21/2019  2:15 PM Encounter Date: 10/21/2019 Status: Signed    : Chelita Granda (Financial Resource Guide)       Medication: Otezla 30mg  Qty: 60 tabs for 30 days  Insurance: [employer]  Test Claim: Product/service not appropriate at this location  Pharmacy: restricted to [facility]  Additional Information: PA expires 05/30/2021

## 2021-06-16 NOTE — TELEPHONE ENCOUNTER
Telephone Encounter by Dina Sher at 8/21/2019  8:22 AM     Author: Dina Sher Service: -- Author Type: --    Filed: 8/21/2019  8:24 AM Encounter Date: 8/20/2019 Status: Signed    : Dina Sher APPROVED:    Approval start date:07/21/2019  Approval end date:08/19/2022    Pharmacy has been notified of approval and will contact patient when medication is ready for pickup.

## 2021-06-16 NOTE — TELEPHONE ENCOUNTER
Telephone Encounter by Chelita Granda at 5/30/2019 11:22 AM     Author: Chelita Granda Service: -- Author Type: Financial Resource Guide    Filed: 5/30/2019 11:30 AM Encounter Date: 5/30/2019 Status: Signed    : Chelita Granda (Financial Resource Guide)       Medication: Otezla Starter and Maintenance Doses  Qty: 55 tabs for 28 days and 60 tabs for 30 days  Effective Dates: 05/30/2019 - 05/30/2021  Pharmacy: restricted to [facility]  Copay Amount: Unknown  Copay Assistance: Enrolled in Otezla Support Plus   Patient Notified? Yes, sent [employer] message

## 2021-06-17 NOTE — TELEPHONE ENCOUNTER
Telephone Encounter by Chelita Gradna at 8/7/2020  1:40 AM     Author: Chelita Granda Service: -- Author Type: Financial Resource Guide    Filed: 8/7/2020  1:43 AM Encounter Date: 8/7/2020 Status: Signed    : Chelita Granda (Financial Resource Guide)       Medication: Humira CF 40mg/0.4ml  Qty: 2 pens for 28 days  Insurance: [employer] ([employer])  Test Claim: refill too soon until 08/26/2020  Pharmacy: restricted to [facility]  Additional Information: PA exp 09/17/2021

## 2021-06-17 NOTE — TELEPHONE ENCOUNTER
Telephone Encounter by Chelita Granda at 7/27/2020  3:46 PM     Author: Chelita Granda Service: -- Author Type: Financial Resource Guide    Filed: 7/27/2020  3:50 PM Encounter Date: 7/27/2020 Status: Signed    : Chelita Granda (Financial Resource Guide)       Medication: Humira CF 40mg/0.4ml  Qty: 2 pens for 28 days  Insurance: [employer] ([employer])  Test Claim: not covered at this location  Pharmacy: restricted to [facility]  Additional Information: PA exp 09/17/2021

## 2021-06-18 NOTE — PROGRESS NOTES
"Sada Winter who presents today with a chief complaint of  Follow-up and Medication Problem, PSA f/u.      Joint Pains: yes  Location: low back,  Onset: chronic, resurfaced 2 wks ago. Skin lesions resurfaced few months ago.  Intensity: can be 10 /10  AM Stiffness: 60 Minutes  Alleviating/Aggravating Factors: enbrel was helpful for about 2 months  Tolerating Meds: otherwise tolerated Enbrel well except for mild infection site reaction.  Other: Enbrel initiated mid Feb/2018.      ROS:  Patient denies having any chest pain, shortness of breath, cough, abdominal pain, nausea, vomiting, rashes, fevers, oral ulcers and recent infections.  Patient admits to having a good appetite      Problem List:  Patient Active Problem List   Diagnosis     Psoriatic arthropathy     Left lumbar radiculopathy        PMH:   No past medical history on file.    Surgical History:  No past surgical history on file.    Family History:  No family history on file.    Social History:   reports that she has quit smoking. She has never used smokeless tobacco.    Allergies:  Allergies   Allergen Reactions     Sulfa (Sulfonamide Antibiotics) Unknown        Current Medications:  Current Outpatient Prescriptions   Medication Sig Dispense Refill     acetaminophen (TYLENOL) 325 MG tablet Take 650 mg by mouth every 6 (six) hours as needed for pain.       etanercept 50 mg/mL (0.98 mL) PnIj Inject 50 mg under the skin every 7 days. (hold dose if you come down with an infection, until infection clears). 4 Syringe 3     ibuprofen (ADVIL,MOTRIN) 200 MG tablet Take 200 mg by mouth every 6 (six) hours as needed for pain.       magnesium 30 mg tablet Take 30 mg by mouth 2 (two) times a day.       No current facility-administered medications for this visit.            Physical Exam:  /68  Pulse 70  Resp 8  Ht 5' 6\" (1.676 m)  Wt 122 lb (55.3 kg)  Breastfeeding? No  BMI 19.69 kg/m2  General: A & O x 3 in NAD  HEENT: EOMI, Non injected/non icteric sclera, " no oral lesions noted  Dermatology: Psoriatic patches noted involving lower extremities bilaterally and extensor surface of left elbow.  CV: s1s2 with RRR, no rubs appreciated   Lungs: CTA B/L, no wheezing , rales or rhonci appreciated  GI: Soft, NT/ND, no rebound, no guarding noted  MS: Positive discomfort involving the left SI joint region on right hip flexion.  Negative straight leg raising bilaterally.  No focal tenderness on palpating SI joints bilaterally.  Otherwise patient demonstrated good passive/active ROM over other milvia ints with no warmth, erythema, tenderness or synovitis noted over these joints.        Summary/Assessment:    History that includes sacroiliitis likely related to psoriatic arthritis, sternoclavicular and sternomanubrial pain.    Presents for follow-up visit and states that Enbrel was helpful for about 2 months thereafter pains resurfaced abruptly particular involving low back/left SI joint region.    Psoriasis has also resurfaced and is unsure if Enbrel was beneficial for psoriasis component.    Takes ibuprofen 400 mg prior to bedtime, which provides some relief however apprehensive to take more frequently, as has been taking for years prior.    States has read up on additional Biologics and is comfortable with pursuing Humira.      Pertinent rheumatology/past medical history (please refer to above for more detailed history):      Sacroiliitis, likely related to psoriatic arthritis     Psoriasis     Right sternoclavicular joint pain    Left-sided sternomanubrial pain    Dry eyes      Rheumatology medications provided/suggested:    Humira    Pertinent medication from other providers or from otc (please refer to above for more detailed med list):    Ibuprofen 200 mg      Pertinent medications already tried:     Clobetasol (?, on prior note)  Tylenol  Enbrel    Pertinent lab history:      Noted to have negative/unremarkable: HLA B27 negative, LAVONNE, rheumatoid factor, CCP antibody, Sjogren  antibodies, ESR, CRP.    Hepatitis B surface antibody positive with surface antigen and core antibody negative.      Pertinent imaging/test history:    From October [facility], appreciated on care everywhere showed: signs of sclerosis predominantly involving iliac side with minimal involvement of sacral side, findings being consistent with having sacroiliitis.        X-rays of sacroiliac joints from December 2017 showed: Chronic bilateral symmetric sacroiliitis with subchondral sclerosis and cystic change and broad-based areas of ankylosis involving both SI joints.    X-rays of lumbar spine show some mild facet degeneration.    X-rays of sternoclavicular joints (Per x-ray department, includes sternum and manubrium) were unremarkable.      Other:    Has already seen a chiropractor.    Stated does not plan on having any additional children.  Has 2 young children ages 5 and 7.  Works as a  part-time.    Denies any history of inflammatory bowel disease or iritis/uveitis.    Plan:      Hold Enbrel will provide Humira instead.  States has already a handout on this medication and is comfortable with the side effect profile.     Again states she will be using natural measures and condoms for contraceptive use and will be discussing further with her PCP regarding best available options.      Can take ibuprofen  200-400 mg every 6 hours, prn.  This low-dose has been helpful however, patient apprehensive to take regularly.     We will check labs today, prior to next visit every 6 months thereafter.    Check labs today and prior to next visit.    Follow-up in 3 months.      Procedure note:         Spent 40 minutes with greater than half of this time spent with the patient going over differential diagnosis, prognosis, treatment plan, medication side effects and  answering questions.    This note was transcribed using Dragon voice recognition software as a result unintentional grammatical  errors or word substitutions may have occurred. Please contact our Rheumatology department if you need any clarification or if you have any related inquiries.    Major side effect profile of medications provided were discussed with the patient.      Gwyn Ma ....................  6/4/2018   4:46 PM

## 2021-06-20 NOTE — LETTER
Letter by Sabas Melo MBBS at      Author: Sabas Melo MBBS Service: -- Author Type: --    Filed:  Encounter Date: 3/25/2020 Status: (Other)                                           March 25, 2020    Patient: Sada Winter   MR Number: [id number]   YOB: 1980   Date of Visit: 3/25/2020      Sada is taking immunosuppressive medication. During this COVID-19 pandemic, she should use  extreme caution. If possible, she should be off of work or have the ability to work from home for the  next two weeks.     Any questions, feel free to contact my office at [phone].     Sincerely,     Sabas Melo MD   Rheumatology

## 2021-06-20 NOTE — LETTER
Letter by Sabas Melo MBBS at      Author: Sabas Melo MBBS Service: -- Author Type: --    Filed:  Encounter Date: 5/22/2020 Status: (Other)                                           May 22, 2020    Patient: Sada Winter   MR Number: [id number]   YOB: 1980   Date of Visit: 5/22/2020        Sada is taking immunosuppressive medication. During this COVID-19 pandemic, she should use  extreme caution. She should have the ability to work from home through September 7th, 2020.       Any questions, please feel free to contact my office at [phone].      Sincerely,      JOHN Forbes

## 2021-06-23 NOTE — TELEPHONE ENCOUNTER
Georgina abarca/[facility] calling on a Stelar PA. This is pretty urgent. Please call asap.    Georgina- [phone]

## 2021-06-23 NOTE — TELEPHONE ENCOUNTER
Dr Melo switched pt to Stelara at her last appt. PA was approved. rx sent to [facility]

## 2021-06-23 NOTE — TELEPHONE ENCOUNTER
Soniya ROSSI approved - 1/5/19-2/5/21    Rx sent to [facility] with loading dose per Dr Melo

## 2021-06-23 NOTE — PROGRESS NOTES
"ASSESSMENT AND PLAN:  Sada Winter 38 y.o. female is seen here on 01/17/19 for establishment of care for psoriatic arthritis which manifested in her by psoriatic sacroiliitis, background of psoriasis, initial excellent response to Enbrel, with worsening of psoriasis now in the background of Humira which she has not had for the past 6 weeks.  She is beginning to get back pain again.  Her dermatologist has suggested Stelara.  At this would be quite appropriate choice.  Had she not had family history of Crohn's one may consider IL 17 inhibitors such as Cosentyx or Taltz.  Not that these are contraindicated in her but perhaps Stelara would be a better choice.  We discussed the various cytokines involved in the milieu of psoriatic arthritis, the role of tumor necrosis factor inhibitors phosphodiesterase 4 inhibitors and this Stelara and others.  She was started off with Stelara.  We will meet here in 3 months or sooner.  Diagnoses and all orders for this visit:    Bilateral sacroiliitis (H)    Psoriasis      HISTORY OF PRESENTING ILLNESS:  Sada Winter, 38 y.o., female is here for establishment of care for psoriatic arthritis.  She is a , who reports that she has had pain in her lower back going as far back as 10 years, extensive workup focusing on lumbar spine failed to reveal clear etiology finally she was seen in rheumatology at Dr. Walsh's office in [facility] sacroiliac joint involvement was detected in the background of psoriasis a diagnosis of psoriasis and psoriatic arthritis was made.  During the initial phase she wanted to try a \"natural\" remedies.  She then started Enbrel.  That worked very well for her it was like a \"miracle\" drug for her.  This helped psoriasis to some degree but the joints improved so much more.  By this she means pain in her neck and back.  She it appears never had any significant pain in her appendicular system.  After 3 months of taking Enbrel it stopped working as quickly " "as it had started originally.  She was changed over to Humira that was in June 2018.  At this once again helped her joint symptoms.  However her psoriasis has gotten worse.  She recalls that when she was in [place] last April she had may be a small patch of psoriasis on her left lower extremity distally however now she has extensive lesions on her shins bilaterally, chest left side, as well as upper extremities.  She has not had Humira since December 3 in part because of some communication issues between insurance and the clinic evidently.  She has beginning to get some discomfort back.  Her daughter observed that she was not moving as well.  She has noted no history of dactylitis.  No history of iritis or uveitis.  There is no family history of psoriasis.  Her dad's brother is said to have Crohn's.  She herself has \"bowel issues\".  She describes herself otherwise in good health.  Further historical information and ADL limitations as noted in the multidimensional health assessment questionnaire attached in the EMR. Rest of the 13 system ROS is negative.     ALLERGIES:Sulfa (sulfonamide antibiotics)    PAST MEDICAL/ACTIVE PROBLEMS/MEDICATION/ FAMILY HISTORY/SOCIAL DATA:  The patient has a family history of  No past medical history on file.  Social History     Tobacco Use   Smoking Status Former Smoker   Smokeless Tobacco Never Used     Patient Active Problem List   Diagnosis     Psoriatic arthropathy (H)     Left lumbar radiculopathy     Current Outpatient Medications   Medication Sig Dispense Refill     acetaminophen (TYLENOL) 325 MG tablet Take 650 mg by mouth every 6 (six) hours as needed for pain.       ibuprofen (ADVIL,MOTRIN) 200 MG tablet Take 200 mg by mouth every 6 (six) hours as needed for pain.       magnesium 30 mg tablet Take 30 mg by mouth 2 (two) times a day.       No current facility-administered medications for this visit.        COMPREHENSIVE EXAMINATION:  Vitals:    01/17/19 1005   BP: 112/70 "   Patient Site: Right Arm   Patient Position: Sitting   Cuff Size: Adult Regular   Pulse: 64   Weight: 124 lb (56.2 kg)     A well appearing alert oriented female. Vital data as noted above. Her eyes without inflammation/scleromalacia. ENTwithout oral mucositis, thrush, nasal deformity, external ear redness, deformity. Her neck is without lymphadenopathy and supple. Lungs normal sounds, no pleural rub. Heart auscultation normal rate, rhythm; no pericardial rub and murmurs. Abdomen soft, non tender, no organomegaly. Skin examined for heliotrope, malar area eruption, lupus pernio, periungual erythema, sclerodactyly, papules, erythema nodosum, purpura, nail pitting, onycholysis, and obvious psoriasis lesion. Neurological examination shows normal alertness, speech, facial symmetry, tone and power in upper and lower extremities, Tinel's and Phalen's at wrist and gait. The joint examination is performed for swelling, tenderness, warmth, erythema, and range of motion in the following joints: DIPs, PIPs, MCPs, wrists, first CMC's, elbows, shoulders, hips, knees, ankles, feet; spine for range of motion and paraspinal muscles for tenderness. The salient normal / abnormal findings are appended.  Extensive lesions of psoriasis on lower extremity bilaterally extending to both the shins, elbow olecranon region.  She does not have nail pitting or onycholysis.  She does not have dactylitis.  There is no enthesitis.  She does not have synovitis of the palpable joints of upper and lower extremities.  Her occiput to wall distance is 0.  Her Schober's is minimal, her flexion at the lumbar spine is well.    LAB / IMAGING DATA:  ALT   Date Value Ref Range Status   01/14/2019 13 0 - 45 U/L Final   06/04/2018 25 0 - 45 U/L Final   12/26/2017 22 0 - 45 U/L Final     Albumin   Date Value Ref Range Status   01/14/2019 3.8 3.5 - 5.0 g/dL Final   06/04/2018 3.9 3.5 - 5.0 g/dL Final   12/26/2017 3.8 3.5 - 5.0 g/dL Final     Creatinine   Date  Value Ref Range Status   01/14/2019 0.84 0.60 - 1.10 mg/dL Final   06/04/2018 0.85 0.60 - 1.10 mg/dL Final   12/26/2017 0.76 0.60 - 1.10 mg/dL Final       WBC   Date Value Ref Range Status   01/14/2019 6.0 4.0 - 11.0 thou/uL Final   06/04/2018 7.2 4.0 - 11.0 thou/uL Final     Hemoglobin   Date Value Ref Range Status   01/14/2019 13.8 12.0 - 16.0 g/dL Final   06/04/2018 13.5 12.0 - 16.0 g/dL Final   12/26/2017 13.4 12.0 - 16.0 g/dL Final     Platelets   Date Value Ref Range Status   01/14/2019 204 140 - 440 thou/uL Final   06/04/2018 242 140 - 440 thou/uL Final   12/26/2017 264 140 - 440 thou/uL Final       Lab Results   Component Value Date    RF <15.0 12/26/2017    SEDRATE 16 12/26/2017

## 2021-06-23 NOTE — TELEPHONE ENCOUNTER
Spoke to Georgina at [facility]- she needed clarification if pt had both psoriatic arthritis and psoriasis which pt does, so Georgina said Soniya would likely be approved and we will get a determination fax when complete.

## 2021-06-24 NOTE — TELEPHONE ENCOUNTER
Ok to wait until Monday.    Patient received the Stelara in the mail.  When she tried to give it to herself the safety went on and now it's locked.  Do you know how to unlock it?

## 2021-06-24 NOTE — TELEPHONE ENCOUNTER
Spoke to [facility] pharmacy, clarified Stelara dose, starting dose is at week 0 then week 4 then every 12 weeks thereafter. They will update rx and send out to pt.

## 2021-06-24 NOTE — TELEPHONE ENCOUNTER
Patient  calling.  He reports, wife was seen yesterday @ ED  For cellulitis in periorbital area of eye. He reports that today, there are more blisters than there were yesterday.  Patient's [facility] is [facility]. Patient  was advised to go to this clinic, and establish care with someone regarding this cellulitis, so that patient has a go to  That can help her .  Patient is on Clindamyacin.150mg three times a day.  Patient need not go back to ED for eval, but to her PCP @ [facility] to establish care there for this condition.     expressed understanding.  Jennifer Khoury RN  [facility] Triage/refill nurse

## 2021-06-27 ENCOUNTER — HEALTH MAINTENANCE LETTER (OUTPATIENT)
Age: 41
End: 2021-06-27

## 2021-07-03 NOTE — ADDENDUM NOTE
Addendum Note by Lul Olguin CMA at 12/26/2017  9:41 AM     Author: Lul Olguin CMA Service: -- Author Type: Certified Medical Assistant    Filed: 12/26/2017  9:41 AM Encounter Date: 12/21/2017 Status: Signed    : Lul Olguin CMA (Certified Medical Assistant)    Addended by: LUL OLGUIN on: 12/26/2017 09:41 AM        Modules accepted: Orders

## 2021-07-03 NOTE — ADDENDUM NOTE
Addendum Note by Brenda Angel RN at 2/12/2019 11:37 AM     Author: Brenda Angel RN Service: -- Author Type: Registered Nurse    Filed: 2/12/2019 11:37 AM Encounter Date: 2/5/2019 Status: Signed    : Brenda Angel RN (Registered Nurse)    Addended by: BRENDA ANGEL on: 2/12/2019 11:37 AM        Modules accepted: Orders

## 2021-07-03 NOTE — ADDENDUM NOTE
Addendum Note by Lul Olguin CMA at 12/26/2017  9:17 AM     Author: Lul Olguin CMA Service: -- Author Type: Certified Medical Assistant    Filed: 12/26/2017  9:17 AM Encounter Date: 12/21/2017 Status: Signed    : Lul Olguin CMA (Certified Medical Assistant)    Addended by: LUL OLGUIN on: 12/26/2017 09:17 AM        Modules accepted: Orders

## 2021-07-03 NOTE — ADDENDUM NOTE
Addendum Note by Brenda Angel RN at 9/23/2019  8:53 AM     Author: Brenda Angel RN Service: -- Author Type: Registered Nurse    Filed: 9/23/2019  8:53 AM Encounter Date: 9/21/2019 Status: Signed    : Brenda Angel RN (Registered Nurse)    Addended by: BRENDA ANGEL on: 9/23/2019 08:53 AM        Modules accepted: Orders

## 2021-10-17 ENCOUNTER — HEALTH MAINTENANCE LETTER (OUTPATIENT)
Age: 41
End: 2021-10-17

## 2021-10-28 ENCOUNTER — LAB (OUTPATIENT)
Dept: LAB | Facility: CLINIC | Age: 41
End: 2021-10-28

## 2021-10-28 DIAGNOSIS — L40.50 PSORIATIC ARTHROPATHY (H): ICD-10-CM

## 2021-10-28 DIAGNOSIS — M46.1 BILATERAL SACROILIITIS (H): ICD-10-CM

## 2021-10-28 DIAGNOSIS — L40.9 PSORIASIS: ICD-10-CM

## 2021-10-28 LAB
ALBUMIN SERPL-MCNC: 3.9 G/DL (ref 3.5–5)
ALT SERPL W P-5'-P-CCNC: 20 U/L (ref 0–45)
CREAT SERPL-MCNC: 0.78 MG/DL (ref 0.6–1.1)
ERYTHROCYTE [DISTWIDTH] IN BLOOD BY AUTOMATED COUNT: 12.7 % (ref 10–15)
GFR SERPL CREATININE-BSD FRML MDRD: >90 ML/MIN/1.73M2
HCT VFR BLD AUTO: 39.9 % (ref 35–47)
HGB BLD-MCNC: 13 G/DL (ref 11.7–15.7)
MCH RBC QN AUTO: 30.2 PG (ref 26.5–33)
MCHC RBC AUTO-ENTMCNC: 32.6 G/DL (ref 31.5–36.5)
MCV RBC AUTO: 93 FL (ref 78–100)
PLATELET # BLD AUTO: 309 10E3/UL (ref 150–450)
RBC # BLD AUTO: 4.3 10E6/UL (ref 3.8–5.2)
WBC # BLD AUTO: 7.6 10E3/UL (ref 4–11)

## 2021-10-28 PROCEDURE — 84460 ALANINE AMINO (ALT) (SGPT): CPT

## 2021-10-28 PROCEDURE — 82565 ASSAY OF CREATININE: CPT

## 2021-10-28 PROCEDURE — 36415 COLL VENOUS BLD VENIPUNCTURE: CPT

## 2021-10-28 PROCEDURE — 85027 COMPLETE CBC AUTOMATED: CPT

## 2021-10-28 PROCEDURE — 82040 ASSAY OF SERUM ALBUMIN: CPT

## 2021-10-29 ENCOUNTER — TELEPHONE (OUTPATIENT)
Dept: RHEUMATOLOGY | Facility: CLINIC | Age: 41
End: 2021-10-29

## 2021-10-29 NOTE — TELEPHONE ENCOUNTER
PA Initiation    Medication: Humira CF - pending   Insurance Company: [employer] - Phone [phone] Fax [phone]  Pharmacy Filling the Rx: [facility] - [address] [place] [address]  Filling Pharmacy Phone: [phone]  Filling Pharmacy Fax: [phone]  Start Date: 10/29/2021

## 2021-11-03 NOTE — TELEPHONE ENCOUNTER
Prior Authorization Approval    Authorization Effective Date: 9/29/2021  Authorization Expiration Date: 10/29/2022  Medication: Humira CF - approved  Approved Dose/Quantity: 2 pens for 28 days  Reference #:  Key: [id number]   Insurance Company: [employer] - Phone [phone] Fax [phone]  Expected CoPay: not covered at this location     CoPay Card Available: Yes    Foundation Assistance Needed: no  Which Pharmacy is filling the prescription (Not needed for infusion/clinic administered): [address]  Pharmacy Notified: Yes  Patient Notified: Yes

## 2022-10-01 ENCOUNTER — HEALTH MAINTENANCE LETTER (OUTPATIENT)
Age: 42
End: 2022-10-01

## 2022-12-09 ENCOUNTER — TELEPHONE (OUTPATIENT)
Dept: RHEUMATOLOGY | Facility: CLINIC | Age: 42
End: 2022-12-09

## 2022-12-13 ENCOUNTER — OFFICE VISIT (OUTPATIENT)
Dept: RHEUMATOLOGY | Facility: CLINIC | Age: 42
End: 2022-12-13
Payer: COMMERCIAL

## 2022-12-13 ENCOUNTER — TELEPHONE (OUTPATIENT)
Dept: RHEUMATOLOGY | Facility: CLINIC | Age: 42
End: 2022-12-13

## 2022-12-13 VITALS
WEIGHT: 125.9 LBS | DIASTOLIC BLOOD PRESSURE: 64 MMHG | HEART RATE: 72 BPM | HEIGHT: 66 IN | SYSTOLIC BLOOD PRESSURE: 96 MMHG | BODY MASS INDEX: 20.23 KG/M2

## 2022-12-13 DIAGNOSIS — L40.50 PSORIATIC ARTHROPATHY (H): Primary | ICD-10-CM

## 2022-12-13 DIAGNOSIS — L40.9 PSORIASIS: ICD-10-CM

## 2022-12-13 DIAGNOSIS — M46.1 BILATERAL SACROILIITIS (H): ICD-10-CM

## 2022-12-13 DIAGNOSIS — Z79.899 HIGH RISK MEDICATION USE: ICD-10-CM

## 2022-12-13 LAB
ALBUMIN SERPL BCG-MCNC: 4.1 G/DL (ref 3.5–5.2)
ALT SERPL W P-5'-P-CCNC: 66 U/L (ref 10–35)
BASOPHILS # BLD AUTO: 0.1 10E3/UL (ref 0–0.2)
BASOPHILS NFR BLD AUTO: 1 %
CREAT SERPL-MCNC: 0.74 MG/DL (ref 0.51–0.95)
CRP SERPL-MCNC: 7.03 MG/L
EOSINOPHIL # BLD AUTO: 0.2 10E3/UL (ref 0–0.7)
EOSINOPHIL NFR BLD AUTO: 3 %
ERYTHROCYTE [DISTWIDTH] IN BLOOD BY AUTOMATED COUNT: 12.9 % (ref 10–15)
ERYTHROCYTE [SEDIMENTATION RATE] IN BLOOD BY WESTERGREN METHOD: 20 MM/HR (ref 0–20)
GFR SERPL CREATININE-BSD FRML MDRD: >90 ML/MIN/1.73M2
HBV SURFACE AG SERPL QL IA: NONREACTIVE
HCT VFR BLD AUTO: 39.1 % (ref 35–47)
HCV AB SERPL QL IA: NONREACTIVE
HGB BLD-MCNC: 13.2 G/DL (ref 11.7–15.7)
IMM GRANULOCYTES # BLD: 0 10E3/UL
IMM GRANULOCYTES NFR BLD: 0 %
LYMPHOCYTES # BLD AUTO: 1.3 10E3/UL (ref 0.8–5.3)
LYMPHOCYTES NFR BLD AUTO: 22 %
MCH RBC QN AUTO: 30.1 PG (ref 26.5–33)
MCHC RBC AUTO-ENTMCNC: 33.8 G/DL (ref 31.5–36.5)
MCV RBC AUTO: 89 FL (ref 78–100)
MONOCYTES # BLD AUTO: 0.4 10E3/UL (ref 0–1.3)
MONOCYTES NFR BLD AUTO: 7 %
NEUTROPHILS # BLD AUTO: 3.9 10E3/UL (ref 1.6–8.3)
NEUTROPHILS NFR BLD AUTO: 67 %
PLATELET # BLD AUTO: 296 10E3/UL (ref 150–450)
RBC # BLD AUTO: 4.38 10E6/UL (ref 3.8–5.2)
WBC # BLD AUTO: 5.8 10E3/UL (ref 4–11)

## 2022-12-13 PROCEDURE — 84460 ALANINE AMINO (ALT) (SGPT): CPT | Performed by: INTERNAL MEDICINE

## 2022-12-13 PROCEDURE — 85652 RBC SED RATE AUTOMATED: CPT | Performed by: INTERNAL MEDICINE

## 2022-12-13 PROCEDURE — 99215 OFFICE O/P EST HI 40 MIN: CPT | Performed by: INTERNAL MEDICINE

## 2022-12-13 PROCEDURE — 86481 TB AG RESPONSE T-CELL SUSP: CPT | Performed by: INTERNAL MEDICINE

## 2022-12-13 PROCEDURE — 82040 ASSAY OF SERUM ALBUMIN: CPT | Performed by: INTERNAL MEDICINE

## 2022-12-13 PROCEDURE — 86803 HEPATITIS C AB TEST: CPT | Performed by: INTERNAL MEDICINE

## 2022-12-13 PROCEDURE — 85025 COMPLETE CBC W/AUTO DIFF WBC: CPT | Performed by: INTERNAL MEDICINE

## 2022-12-13 PROCEDURE — 36415 COLL VENOUS BLD VENIPUNCTURE: CPT | Performed by: INTERNAL MEDICINE

## 2022-12-13 PROCEDURE — 86140 C-REACTIVE PROTEIN: CPT | Performed by: INTERNAL MEDICINE

## 2022-12-13 PROCEDURE — 82565 ASSAY OF CREATININE: CPT | Performed by: INTERNAL MEDICINE

## 2022-12-13 PROCEDURE — 87340 HEPATITIS B SURFACE AG IA: CPT | Performed by: INTERNAL MEDICINE

## 2022-12-13 RX ORDER — IXEKIZUMAB 80 MG/ML
INJECTION, SOLUTION SUBCUTANEOUS
Qty: 2 ML | Refills: 4 | Status: SHIPPED | OUTPATIENT
Start: 2022-12-13 | End: 2023-03-16

## 2022-12-13 RX ORDER — VIT C/B6/B5/MAGNESIUM/HERB 173 50-5-6-5MG
CAPSULE ORAL
COMMUNITY

## 2022-12-13 NOTE — TELEPHONE ENCOUNTER
PA Initiation    Medication: Taltz - PA Pending  Insurance Company: [employer] - Phone [phone] Fax [phone]  Pharmacy Filling the Rx: [facility] [address]  Filling Pharmacy Phone:    Filling Pharmacy Fax:    Start Date: 12/13/2022    Key: [id number]

## 2022-12-13 NOTE — PROGRESS NOTES
"      Rheumatology follow-up visit note     Sada is a 42 year old female presents today for follow-up.    Sada was seen today for recheck.    Diagnoses and all orders for this visit:    Psoriatic arthropathy (H)  -     Erythrocyte sedimentation rate auto; Future  -     CRP inflammation; Future  -     Albumin level; Future  -     ALT; Future  -     CBC with Platelets & Differential; Future  -     Creatinine; Future  -     ixekizumab (TALTZ) 80 MG/ML SOSY prefilled syringe; 160 mg once and then followed by 80 mg every 28 days.  -     Erythrocyte sedimentation rate auto  -     CRP inflammation  -     Albumin level  -     ALT  -     CBC with Platelets & Differential  -     Creatinine    Psoriasis  -     ixekizumab (TALTZ) 80 MG/ML SOSY prefilled syringe; 160 mg once and then followed by 80 mg every 28 days.    Bilateral sacroiliitis (H)  -     Erythrocyte sedimentation rate auto; Future  -     CRP inflammation; Future  -     Albumin level; Future  -     ALT; Future  -     CBC with Platelets & Differential; Future  -     Creatinine; Future  -     ixekizumab (TALTZ) 80 MG/ML SOSY prefilled syringe; 160 mg once and then followed by 80 mg every 28 days.  -     Erythrocyte sedimentation rate auto  -     CRP inflammation  -     Albumin level  -     ALT  -     CBC with Platelets & Differential  -     Creatinine    High risk medication use  -     Quantiferon-TB Gold Plus; Future  -     Hepatitis C antibody; Future  -     Hepatitis B surface antigen; Future  -     Quantiferon-TB Gold Plus  -     Hepatitis C antibody  -     Hepatitis B surface antigen        This patient with psoriatic arthritis, psoriasis, sacroiliitis has low back pain.  She has had lumbar spondylosis.  After having had a flareup today she feels a bit better.  The key question that is going to require further observation is if this low back pain is a reflection of relapse of her psoriatic arthritis or \"flareup\" of lumbar spondylosis.  We discussed how there are " some clinical clues but sometimes there is a quite a bit of an overlap in the symptoms of the to conditions.  Of the various options we decided that she will go for Cosentyx instead of Humira given the staff infections, it is conceivable that her pain might get better in near future in which case then we will revisit this plan.    Follow up in 3 months.    RICARDO    Sada Winter is a 42 year old female is here for follow-up of psoriatic arthritis, sacroiliitis with ankylosis, and lumbar spondylosis.  She has significant psoriasis.  She was last seen here March 2021.  At that time she had been on Humira which was on the best interventions for her psoriatic arthritis.  Prior to that Stelara had been good for her skin but not for the joints and prior to that Enbrel had been good for both.  She had not been on methotrexate at any point and part because of the reproductive aspects.  She had not had IL-17 elevators.  While she was on Humira she did have superficial staph infections requiring antibiotics on a fairly regular basis otherwise she had tolerated it well.  In part because of her frustrating experience with her pharmacy she stopped taking Humira and had remained well during the interim between March 21 and now.  A few days ago she started experiencing pain in her lower back.  This has been quite severe pain interfering with day-to-day activities this is on the left side.  With some radiation down to the mid thigh hamstring area.  She has not had radicular symptoms.  No other joint areas of been affected.  After having had a lot more pain during the past several days today she has felt somewhat better.    She continues to work with the [employer] office, [employer], mostly from home, did develop Covid end of October all the family, she had headaches exhaustion and sense of taste and smell were lost,  had more cough, her kids did but she better.  We talked about COVID-19 vaccination.  Follow-up here in 6 months with  "labs prior.          DETAILED EXAMINATION  12/13/22  :    Vitals:    12/13/22 0818   BP: 96/64   BP Location: Right arm   Patient Position: Sitting   Cuff Size: Adult Regular   Pulse: 72   Weight: 57.1 kg (125 lb 14.4 oz)   Height: 1.676 m (5' 6\")     Alert oriented. Head including the face is examined for malar rash, heliotropes, scarring, lupus pernio. Eyes examined for redness such as in episcleritis/scleritis, periorbital lesions.   Neck/ Face examined for parotid gland swelling, range of motion of neck.  Left upper and lower and right upper and lower extremities examined for tenderness, swelling, warmth of the appendicular joints, range of motion, edema, rash.  Some of the important findings included: she does not have evidence of synovitis in the palpable joints of the upper extremities.  Her gait is affected, she walks \"stiffly\", occipital wall distance is 0.  She does not have dactylitis.  There is no swelling of the knees.  None of the upper extremity joints there are palpable show evidence of synovitis.     Patient Active Problem List    Diagnosis Date Noted     High risk medication use 05/20/2020     Priority: Medium     Bilateral sacroiliitis (H) 01/17/2019     Priority: Medium     Psoriasis 01/17/2019     Priority: Medium     Psoriatic arthropathy (H) 01/04/2018     Priority: Medium     Past Surgical History:   Procedure Laterality Date     XR INTERMEDIATE JOINT OR BURSA INJ/ASP UNILATERAL  9/13/2019      No past medical history on file.  Allergies   Allergen Reactions     Sulfa (Sulfonamide Antibiotics) [Sulfa Drugs] Unknown     Current Outpatient Medications   Medication Sig Dispense Refill     acetaminophen (TYLENOL) 325 MG tablet [ACETAMINOPHEN (TYLENOL) 325 MG TABLET] Take 650 mg by mouth every 6 (six) hours as needed for pain.       ibuprofen (ADVIL,MOTRIN) 200 MG tablet [IBUPROFEN (ADVIL,MOTRIN) 200 MG TABLET] Take 200 mg by mouth every 6 (six) hours as needed for pain.       magnesium 30 mg " tablet [MAGNESIUM 30 MG TABLET] Take 30 mg by mouth 2 (two) times a day.       Turmeric 500 MG CAPS Take 3 tablet every day.       UNABLE TO FIND MEDICATION NAME: CBD oil       HUMIRA *CF* PEN 40 MG/0.4ML pen kit Inject 0.4 mLs (40 mg) Subcutaneous every 14 days (Patient not taking: Reported on 12/13/2022) 2 each 3     naproxen (NAPROSYN) 500 MG tablet [NAPROXEN (NAPROSYN) 500 MG TABLET] TAKE 1 TABLET BY MOUTH TWICE A DAY WITH MEALS (Patient not taking: Reported on 12/13/2022) 60 tablet 1     family history is not on file.  Social Connections: Not on file          WBC Count   Date Value Ref Range Status   10/28/2021 7.6 4.0 - 11.0 10e3/uL Final     RBC Count   Date Value Ref Range Status   10/28/2021 4.30 3.80 - 5.20 10e6/uL Final     Hemoglobin   Date Value Ref Range Status   10/28/2021 13.0 11.7 - 15.7 g/dL Final     Hematocrit   Date Value Ref Range Status   10/28/2021 39.9 35.0 - 47.0 % Final     MCV   Date Value Ref Range Status   10/28/2021 93 78 - 100 fL Final     MCH   Date Value Ref Range Status   10/28/2021 30.2 26.5 - 33.0 pg Final     Platelet Count   Date Value Ref Range Status   10/28/2021 309 150 - 450 10e3/uL Final     % Lymphocytes   Date Value Ref Range Status   02/27/2019 11 (L) 20 - 40 % Final     AST   Date Value Ref Range Status   02/27/2019 21 0 - 40 U/L Final     ALT   Date Value Ref Range Status   10/28/2021 20 0 - 45 U/L Final     Albumin   Date Value Ref Range Status   10/28/2021 3.9 3.5 - 5.0 g/dL Final     Alkaline Phosphatase   Date Value Ref Range Status   02/27/2019 77 45 - 120 U/L Final     Creatinine   Date Value Ref Range Status   10/28/2021 0.78 0.60 - 1.10 mg/dL Final     GFR Estimate   Date Value Ref Range Status   10/28/2021 >90 >60 mL/min/1.73m2 Final     Comment:     As of July 11, 2021, eGFR is calculated by the CKD-EPI creatinine equation, without race adjustment. eGFR can be influenced by muscle mass, exercise, and diet. The reported eGFR is an estimation only and is only  applicable if the renal function is stable.   03/11/2021 >60 >60 mL/min/1.73m2 Final     GFR Estimate If Black   Date Value Ref Range Status   03/11/2021 >60 >60 mL/min/1.73m2 Final

## 2022-12-14 LAB
GAMMA INTERFERON BACKGROUND BLD IA-ACNC: 0.03 IU/ML
M TB IFN-G BLD-IMP: NEGATIVE
M TB IFN-G CD4+ BCKGRND COR BLD-ACNC: 9.97 IU/ML
MITOGEN IGNF BCKGRD COR BLD-ACNC: -0.01 IU/ML
MITOGEN IGNF BCKGRD COR BLD-ACNC: 0.01 IU/ML
QUANTIFERON MITOGEN: 10 IU/ML
QUANTIFERON NIL TUBE: 0.03 IU/ML
QUANTIFERON TB1 TUBE: 0.04 IU/ML
QUANTIFERON TB2 TUBE: 0.02

## 2022-12-20 NOTE — TELEPHONE ENCOUNTER
Prior Authorization Approval    Authorization Effective Date: 11/13/2022  Authorization Expiration Date: 6/11/2023  Medication: Taltz - Approved  Approved Dose/Quantity: FDA-approved dosing  Reference #: Key: [id number] [place] Case ID: [id number]   Insurance Company: [employer] - Phone [phone] Fax [phone]  Expected CoPay: Unknown - [facility] not eligible     CoPay Card Available: Yes Comment:  [email or web address]  Foundation Assistance Needed:    Which Pharmacy is filling the prescription (Not needed for infusion/clinic administered): [address]  Pharmacy Notified: Yes  Patient Notified: Yes

## 2022-12-23 ENCOUNTER — LAB REQUISITION (OUTPATIENT)
Dept: LAB | Facility: CLINIC | Age: 42
End: 2022-12-23

## 2022-12-23 DIAGNOSIS — R79.89 OTHER SPECIFIED ABNORMAL FINDINGS OF BLOOD CHEMISTRY: ICD-10-CM

## 2022-12-23 LAB
ALBUMIN SERPL BCG-MCNC: 4.3 G/DL (ref 3.5–5.2)
ALP SERPL-CCNC: 158 U/L (ref 35–104)
ALT SERPL W P-5'-P-CCNC: 58 U/L (ref 10–35)
AST SERPL W P-5'-P-CCNC: 41 U/L (ref 10–35)
BILIRUB DIRECT SERPL-MCNC: <0.2 MG/DL (ref 0–0.3)
BILIRUB SERPL-MCNC: 0.3 MG/DL
IRON BINDING CAPACITY (ROCHE): 287 UG/DL (ref 240–430)
IRON SATN MFR SERPL: 23 % (ref 15–46)
IRON SERPL-MCNC: 66 UG/DL (ref 37–145)
PROT SERPL-MCNC: 7.2 G/DL (ref 6.4–8.3)
TSH SERPL DL<=0.005 MIU/L-ACNC: 2.56 UIU/ML (ref 0.3–4.2)

## 2022-12-23 PROCEDURE — 83516 IMMUNOASSAY NONANTIBODY: CPT | Performed by: PHYSICIAN ASSISTANT

## 2022-12-23 PROCEDURE — 86038 ANTINUCLEAR ANTIBODIES: CPT | Performed by: PHYSICIAN ASSISTANT

## 2022-12-23 PROCEDURE — 83550 IRON BINDING TEST: CPT | Performed by: PHYSICIAN ASSISTANT

## 2022-12-23 PROCEDURE — 80076 HEPATIC FUNCTION PANEL: CPT | Performed by: PHYSICIAN ASSISTANT

## 2022-12-23 PROCEDURE — 82784 ASSAY IGA/IGD/IGG/IGM EACH: CPT | Performed by: PHYSICIAN ASSISTANT

## 2022-12-23 PROCEDURE — 84443 ASSAY THYROID STIM HORMONE: CPT | Performed by: PHYSICIAN ASSISTANT

## 2022-12-26 LAB
ANA SER QL IF: NEGATIVE
GLIADIN IGA SER-ACNC: 1.8 U/ML
GLIADIN IGG SER-ACNC: <0.6 U/ML
IGA SERPL-MCNC: 393 MG/DL (ref 84–499)
TTG IGA SER-ACNC: 1.3 U/ML
TTG IGG SER-ACNC: 1.1 U/ML

## 2023-01-01 NOTE — PROGRESS NOTES
ASSESSMENT AND PLAN:  Sada Winter 39 y.o. female is seen here on 09/11/19 for follow-up in the background of psoriatic arthritis sacroiliitis, with fusion, psoriatic arthritis, lumbar and  thoracic spondylitic changes.  She has significant pain in her right sternoclavicular joint where she has marked inflammation, this is despite Stelara and Otezla which helped cleared up most of her symptoms and psoriasis although her joint symptoms are not as well-controlled as she was on Humira and has wondered if the back on the instead of Stelara is going to check with a dermatologist before that change can be considered.  In the meanwhile of the various options I have suggested that she considers corticosteroid injection this will be done at the hospital in the right sternoclavicular joint.  Follow-up here in 2 months.          Diagnoses and all orders for this visit:    Psoriatic arthropathy (H)    Bilateral sacroiliitis (H)    Psoriasis    Arthritis of right sternoclavicular joint  -     XR Injection Intermediate Joint; Future; Expected date: 09/11/2019      HISTORY OF PRESENTING ILLNESS:  Sada Winter, 39 y.o., female is here for follow-up of psoriatic arthritis.  She is a , who reports that she has been hurting in her right shoulder area but that she meant localizing the pain at the right sternoclavicular region, this is severe, interfering with day-to-day activities, she rated this pain at 9/10, she recalls that something like this did happen back in 2000 14/2015.  This indeed is a severe to fall her pains.  She has had intermittent neck and back pain.  She is on Stelara and Otezla her skin has cleared quite nicely.  She still gets joint symptoms as noted in the lower back more recently in the neck.  She has stiffness which is persistent throughout the day. She has beginning to get some discomfort back.  Her daughter observed that she was not moving as well.  She has noted no history of dactylitis.  No history of  "iritis or uveitis.  There is no family history of psoriasis.  Her dad's brother is said to have Crohn's.  She herself has \"bowel issues\".  She describes herself otherwise in good health.  Further historical information and ADL limitations as noted in the multidimensional health assessment questionnaire attached in the EMR.       She has had pain in her lower back going as far back as 10 years, she feels that while she was on Humira the low back discomfort was better than it is on Stelara although psoriasis is better controlled with the latter.  She takes a tablet of Tylenol and ibuprofen over-the-counter that helps her throughout the night and the day.  She rated pain between moderately severe to severe seven-point with several day-to-day activities.  Her work-up in the past has shown evidence of sacroiliitis, and that of lumbar and thoracic spondylosis.  During the initial phase she wanted to try a \"natural\" remedies.  She then started Enbrel.  That worked very well for her it was like a \"miracle\" drug for her.  This helped psoriasis to some degree but the joints improved so much more.  By this she means pain in her neck and back.  She it appears never had any significant pain in her appendicular system.  After 3 months of taking Enbrel it stopped working as quickly as it had started originally.  She was changed over to Humira that was in June 2018.  At this once again helped her joint symptoms.  However her psoriasis has gotten worse.  She recalls that when she was in [place] last April she had may be a small patch of psoriasis on her left lower extremity distally however now she has extensive lesions on her shins bilaterally, chest left side, as well as upper extremities.  She has not had Humira since December 3 in part because of some communication issues between insurance and the clinic evidently.    ALLERGIES:Sulfa (sulfonamide antibiotics)    PAST MEDICAL/ACTIVE PROBLEMS/MEDICATION/ FAMILY HISTORY/SOCIAL " DATA:  The patient has a family history of  No past medical history on file.  Social History     Tobacco Use   Smoking Status Former Smoker   Smokeless Tobacco Never Used     Patient Active Problem List   Diagnosis     Psoriatic arthropathy (H)     Left lumbar radiculopathy     Bilateral sacroiliitis (H)     Psoriasis     Current Outpatient Medications   Medication Sig Dispense Refill     acetaminophen (TYLENOL) 325 MG tablet Take 650 mg by mouth every 6 (six) hours as needed for pain.       apremilast (OTEZLA STARTER) 10 mg (4)-20 mg (4)-30 mg (47) DsPk Take 10 mg by mouth daily. Then follow gradual increase as directed on package 55 tablet 0     apremilast (OTEZLA) 30 mg Tab Take 30 mg by mouth 2 (two) times a day. 60 tablet 3     ibuprofen (ADVIL,MOTRIN) 200 MG tablet Take 200 mg by mouth every 6 (six) hours as needed for pain.       magnesium 30 mg tablet Take 30 mg by mouth 2 (two) times a day.       naproxen (NAPROSYN) 500 MG tablet TAKE 1 TABLET BY MOUTH TWICE A DAY WITH MEALS 60 tablet 1     omeprazole (PRILOSEC) 20 MG capsule TAKE 1 CAPSULE (20 MG TOTAL) BY MOUTH DAILY BEFORE BREAKFAST. 30 capsule 2     ustekinumab 45 mg/0.5 mL Syrg Inject 45mg subcutaneously once every 12 weeks 2 Syringe 1     No current facility-administered medications for this visit.      DETAILED EXAMINATION  09/11/19  :  There were no vitals filed for this visit.  Alert oriented. Head including the face is examined for malar rash, heliotropes, scarring, lupus pernio. Eyes examined for redness such as in episcleritis/scleritis, periorbital lesions.   Neck/ Face examined for parotid gland swelling, range of motion of neck.  Left upper and lower and right upper and lower extremities examined for tenderness, swelling, warmth of the appendicular joints, range of motion, edema, rash.  Some of the important findings included:  Her psoriasis is cleared nicely.  She has acutely inflamed warm swollen tender right sternoclavicular joint.  None  of the other appendical joints are inflamethere is no dactylitis, she does not have synovitis of palpable joints of upper extremities, knees. Her occiput to wall distance is 0..    LAB / IMAGING DATA:  ALT   Date Value Ref Range Status   02/27/2019 20 0 - 45 U/L Final   01/14/2019 13 0 - 45 U/L Final   06/04/2018 25 0 - 45 U/L Final     Albumin   Date Value Ref Range Status   02/27/2019 4.0 3.5 - 5.0 g/dL Final   01/14/2019 3.8 3.5 - 5.0 g/dL Final   06/04/2018 3.9 3.5 - 5.0 g/dL Final     Creatinine   Date Value Ref Range Status   02/27/2019 0.83 0.60 - 1.10 mg/dL Final   01/14/2019 0.84 0.60 - 1.10 mg/dL Final   06/04/2018 0.85 0.60 - 1.10 mg/dL Final       WBC   Date Value Ref Range Status   02/27/2019 10.9 4.0 - 11.0 thou/uL Final   01/14/2019 6.0 4.0 - 11.0 thou/uL Final     Hemoglobin   Date Value Ref Range Status   02/27/2019 14.7 12.0 - 16.0 g/dL Final   01/14/2019 13.8 12.0 - 16.0 g/dL Final   06/04/2018 13.5 12.0 - 16.0 g/dL Final     Platelets   Date Value Ref Range Status   02/27/2019 260 140 - 440 thou/uL Final   01/14/2019 204 140 - 440 thou/uL Final   06/04/2018 242 140 - 440 thou/uL Final       Lab Results   Component Value Date    RF <15.0 12/26/2017    SEDRATE 16 12/26/2017           N/A

## 2023-04-05 ENCOUNTER — LAB (OUTPATIENT)
Dept: LAB | Facility: CLINIC | Age: 43
End: 2023-04-05
Payer: COMMERCIAL

## 2023-04-05 ENCOUNTER — OFFICE VISIT (OUTPATIENT)
Dept: RHEUMATOLOGY | Facility: CLINIC | Age: 43
End: 2023-04-05
Payer: COMMERCIAL

## 2023-04-05 VITALS
SYSTOLIC BLOOD PRESSURE: 122 MMHG | HEART RATE: 78 BPM | BODY MASS INDEX: 19.82 KG/M2 | DIASTOLIC BLOOD PRESSURE: 72 MMHG | WEIGHT: 122.8 LBS

## 2023-04-05 DIAGNOSIS — R74.01 ALT (SGPT) LEVEL RAISED: ICD-10-CM

## 2023-04-05 DIAGNOSIS — L40.9 PSORIASIS: ICD-10-CM

## 2023-04-05 DIAGNOSIS — L40.50 PSORIATIC ARTHROPATHY (H): Primary | ICD-10-CM

## 2023-04-05 DIAGNOSIS — Z79.899 HIGH RISK MEDICATION USE: ICD-10-CM

## 2023-04-05 DIAGNOSIS — L40.50 PSORIATIC ARTHROPATHY (H): ICD-10-CM

## 2023-04-05 DIAGNOSIS — M46.1 BILATERAL SACROILIITIS (H): ICD-10-CM

## 2023-04-05 LAB
ALBUMIN SERPL BCG-MCNC: 4.1 G/DL (ref 3.5–5.2)
ALT SERPL W P-5'-P-CCNC: 23 U/L (ref 10–35)
CREAT SERPL-MCNC: 0.82 MG/DL (ref 0.51–0.95)
ERYTHROCYTE [DISTWIDTH] IN BLOOD BY AUTOMATED COUNT: 13.4 % (ref 10–15)
GFR SERPL CREATININE-BSD FRML MDRD: 90 ML/MIN/1.73M2
HCT VFR BLD AUTO: 38.4 % (ref 35–47)
HGB BLD-MCNC: 13 G/DL (ref 11.7–15.7)
MCH RBC QN AUTO: 29.7 PG (ref 26.5–33)
MCHC RBC AUTO-ENTMCNC: 33.9 G/DL (ref 31.5–36.5)
MCV RBC AUTO: 88 FL (ref 78–100)
PLATELET # BLD AUTO: 272 10E3/UL (ref 150–450)
RBC # BLD AUTO: 4.38 10E6/UL (ref 3.8–5.2)
WBC # BLD AUTO: 6.9 10E3/UL (ref 4–11)

## 2023-04-05 PROCEDURE — 84460 ALANINE AMINO (ALT) (SGPT): CPT

## 2023-04-05 PROCEDURE — 85027 COMPLETE CBC AUTOMATED: CPT

## 2023-04-05 PROCEDURE — 82565 ASSAY OF CREATININE: CPT

## 2023-04-05 PROCEDURE — 82040 ASSAY OF SERUM ALBUMIN: CPT

## 2023-04-05 PROCEDURE — 36415 COLL VENOUS BLD VENIPUNCTURE: CPT

## 2023-04-05 PROCEDURE — 99214 OFFICE O/P EST MOD 30 MIN: CPT | Performed by: INTERNAL MEDICINE

## 2023-04-05 NOTE — PROGRESS NOTES
"      Rheumatology follow-up visit note     Sada is a 42 year old female presents today for follow-up.    Sada was seen today for recheck.    Diagnoses and all orders for this visit:    Psoriatic arthropathy (H)  -     ALT; Standing  -     Albumin level; Standing  -     CBC with platelets; Standing  -     Creatinine; Standing    Psoriasis    Bilateral sacroiliitis (H)    High risk medication use  -     ALT; Standing  -     Albumin level; Standing  -     CBC with platelets; Standing  -     Creatinine; Standing    ALT (SGPT) level raised        This patient has marked flareup of psoriatic arthritis affecting multiple joint areas.  While she does have lumbar cervical spondylosis she understood that her pains were secondary to degenerative joint disease only and did not start Toltz right away as discussed in December however now she is on it, she has had 1 dose already.  She needed some prednisone as well.  She is going to continue Taltz.  Her liver abnormality would be a matter of ongoing concern we will recheck that today again.  She is inclined to not take anymore prednisone however if needed she will call in for refill of 7.5 mg which may be used up to 30 days,  we will meet here in the next 2 months or sooner.    Follow up in 2 months.    HPI    Sada Winter is a 42 year old female is here for follow-up of psoriatic arthritis, sacroiliitis with ankylosis, and lumbar spondylosis.  She has psoriasis.  During her visit here most recent visit she was started on Toltz.  She did not start taking it until about a week ago when she took the first dose.  She was under the impression that most of her joint symptoms are because of \"osteoarthritis\" this is based on the discussion that I had with her regarding lumbar/cervical spondylosis.  In the meanwhile she started experiencing a flareup affecting her wrist on the right hand than the left and now the ankles.  She needed prednisone which was prescribed.  She also had " prednisone from her primary physician at a much higher dose that she recalls for sinus congestion.  She has improved somewhat.  She has residual pain in the wrists and the ankles. She was last seen here March 2021.  At that time she had been on Humira which was on the best interventions for her psoriatic arthritis.  Prior to that Stelara had been good for her skin but not for the joints and prior to that Enbrel had been good for both.  She had not been on methotrexate at any point and part because of the reproductive aspects.  She had not had IL-17 eleva anything tors.  While she was on Humira she did have superficial staph infections requiring antibiotics on a fairly regular basis otherwise she had tolerated it well.  In part because of her frustrating experience with her pharmacy she stopped taking Humira and had remained well during the interim between March 21 and now.  A few days ago she started experiencing pain in her lower back.  This has been quite severe pain interfering with day-to-day activities this is on the left side.  With some radiation down to the mid thigh hamstring area.  She has not had radicular symptoms.  No other joint areas of been affected.  After having had a lot more pain during the past several days today she has felt somewhat better.    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       DETAILED EXAMINATION  04/05/23  :    Vitals:    04/05/23 1344   BP: 122/72   Pulse: 78   Weight: 55.7 kg (122 lb 12.8 oz)     Alert oriented. Head including the face is examined for malar rash, heliotropes, scarring, lupus pernio. Eyes examined for redness such as in episcleritis/scleritis, periorbital lesions.   Neck/ Face examined for parotid gland swelling, range of motion of neck.  Left upper and lower and right upper and lower extremities examined for tenderness, swelling, warmth of the appendicular joints, range of motion, edema, rash.  Some of the important findings  included: She has synovitis in her wrists, scattered MCP involvement as they do, both her ankles are swollen, faint hyperemia and tender worse on the right side.  She does not have dactylitis of digits or toes.  Patient Active Problem List    Diagnosis Date Noted     High risk medication use 05/20/2020     Priority: Medium     Bilateral sacroiliitis (H) 01/17/2019     Priority: Medium     Psoriasis 01/17/2019     Priority: Medium     Psoriatic arthropathy (H) 01/04/2018     Priority: Medium     Past Surgical History:   Procedure Laterality Date     XR INTERMEDIATE JOINT OR BURSA INJ/ASP UNILATERAL  9/13/2019      No past medical history on file.  Allergies   Allergen Reactions     Sulfa (Sulfonamide Antibiotics) [Sulfa Drugs] Unknown     Current Outpatient Medications   Medication Sig Dispense Refill     ixekizumab (TALTZ) 80 MG/ML SOSY prefilled syringe 160 mg once and then followed by 80 mg every 28 days. 2 mL 2     magnesium 30 mg tablet [MAGNESIUM 30 MG TABLET] Take 30 mg by mouth 2 (two) times a day.       UNABLE TO FIND MEDICATION NAME: CBD oil       acetaminophen (TYLENOL) 325 MG tablet [ACETAMINOPHEN (TYLENOL) 325 MG TABLET] Take 650 mg by mouth every 6 (six) hours as needed for pain. (Patient not taking: Reported on 4/5/2023)       ibuprofen (ADVIL,MOTRIN) 200 MG tablet [IBUPROFEN (ADVIL,MOTRIN) 200 MG TABLET] Take 200 mg by mouth every 6 (six) hours as needed for pain. (Patient not taking: Reported on 4/5/2023)       naproxen (NAPROSYN) 500 MG tablet [NAPROXEN (NAPROSYN) 500 MG TABLET] TAKE 1 TABLET BY MOUTH TWICE A DAY WITH MEALS (Patient not taking: Reported on 12/13/2022) 60 tablet 1     predniSONE (DELTASONE) 5 MG tablet Take 1.5 tablets (7.5 mg) by mouth daily (Patient not taking: Reported on 4/5/2023) 21 tablet 0     Turmeric 500 MG CAPS Take 3 tablet every day. (Patient not taking: Reported on 4/5/2023)       family history is not on file.  Social Connections: Not on file          WBC Count   Date  Value Ref Range Status   12/13/2022 5.8 4.0 - 11.0 10e3/uL Final     RBC Count   Date Value Ref Range Status   12/13/2022 4.38 3.80 - 5.20 10e6/uL Final     Hemoglobin   Date Value Ref Range Status   12/13/2022 13.2 11.7 - 15.7 g/dL Final     Hematocrit   Date Value Ref Range Status   12/13/2022 39.1 35.0 - 47.0 % Final     MCV   Date Value Ref Range Status   12/13/2022 89 78 - 100 fL Final     MCH   Date Value Ref Range Status   12/13/2022 30.1 26.5 - 33.0 pg Final     Platelet Count   Date Value Ref Range Status   12/13/2022 296 150 - 450 10e3/uL Final     % Lymphocytes   Date Value Ref Range Status   12/13/2022 22 % Final     AST   Date Value Ref Range Status   12/23/2022 41 (H) 10 - 35 U/L Final     ALT   Date Value Ref Range Status   12/23/2022 58 (H) 10 - 35 U/L Final     Albumin   Date Value Ref Range Status   12/23/2022 4.3 3.5 - 5.2 g/dL Final   10/28/2021 3.9 3.5 - 5.0 g/dL Final     Alkaline Phosphatase   Date Value Ref Range Status   12/23/2022 158 (H) 35 - 104 U/L Final     Creatinine   Date Value Ref Range Status   12/13/2022 0.74 0.51 - 0.95 mg/dL Final     GFR Estimate   Date Value Ref Range Status   12/13/2022 >90 >60 mL/min/1.73m2 Final     Comment:     Effective December 21, 2021 eGFRcr in adults is calculated using the 2021 CKD-EPI creatinine equation which includes age and gender (Allyn et al., NEJM, DOI: 10.1056/YSTAvb0165788)   03/11/2021 >60 >60 mL/min/1.73m2 Final     GFR Estimate If Black   Date Value Ref Range Status   03/11/2021 >60 >60 mL/min/1.73m2 Final     Erythrocyte Sedimentation Rate   Date Value Ref Range Status   12/13/2022 20 0 - 20 mm/hr Final

## 2023-05-14 ENCOUNTER — HEALTH MAINTENANCE LETTER (OUTPATIENT)
Age: 43
End: 2023-05-14

## 2023-06-22 ENCOUNTER — PHARMACY VISIT (OUTPATIENT)
Dept: ADMINISTRATIVE | Facility: CLINIC | Age: 43
End: 2023-06-22
Payer: COMMERCIAL

## 2023-06-22 NOTE — PROGRESS NOTES
Psoriatic Arthritis Clinical Follow Up Assessment   Completed on  19:02:04 [facility], by Cheryl Jeffery        Patient  Patient Name: ANÍBAL SMITH  :   EHR ID: [id number]       Activity Date      Activity Medications    TALTZ        Care Details    What are the patient's goals for this therapy?   ? 4/3/23 to have improved PsO control and not experience recurrent Staph infections      Is patient meeting treatment goals?   ? Not appropriate to assess at this time        Please select CURRENT side effect(s) for monitoring:   ? Injection site reaction          Summary Notes   Spoke with pt briefly today for TM assessment. Tatlz 80mg every 4 weeks. Pt declined refilling med today. Stating she needs to call her rheumatologist to follow up on ISR. She gave her 3rd dose and still had an allergic reaction. Swelling in significant area (around 1 foot in diameter) and still having some hives and itching. Agreed with pt that follow is needed before continuing on medication. Invited pt to call if any questions or concerns arise.   Pending TM 2 weeks to chart review and follow up if needed.     Cheryl Jeffery, Pharm.D.[place] Special[facility] Pharmacist  [address] [place] [address]  [email or web address]  Office: [phone]

## 2023-08-12 ENCOUNTER — HEALTH MAINTENANCE LETTER (OUTPATIENT)
Age: 43
End: 2023-08-12

## 2023-10-05 RX ORDER — IXEKIZUMAB 80 MG/ML
INJECTION, SOLUTION SUBCUTANEOUS
Qty: 1 ML | Refills: 3 | OUTPATIENT
Start: 2023-10-05

## 2023-11-09 ENCOUNTER — LAB (OUTPATIENT)
Dept: LAB | Facility: CLINIC | Age: 43
End: 2023-11-09
Payer: COMMERCIAL

## 2023-11-09 DIAGNOSIS — L40.50 PSORIATIC ARTHROPATHY (H): ICD-10-CM

## 2023-11-09 DIAGNOSIS — Z79.899 HIGH RISK MEDICATION USE: ICD-10-CM

## 2023-11-09 LAB
ALBUMIN SERPL BCG-MCNC: 4.1 G/DL (ref 3.5–5.2)
ALT SERPL W P-5'-P-CCNC: 23 U/L (ref 0–50)
CREAT SERPL-MCNC: 0.79 MG/DL (ref 0.51–0.95)
EGFRCR SERPLBLD CKD-EPI 2021: >90 ML/MIN/1.73M2
ERYTHROCYTE [DISTWIDTH] IN BLOOD BY AUTOMATED COUNT: 12.3 % (ref 10–15)
HCT VFR BLD AUTO: 38.7 % (ref 35–47)
HGB BLD-MCNC: 13.4 G/DL (ref 11.7–15.7)
MCH RBC QN AUTO: 31.2 PG (ref 26.5–33)
MCHC RBC AUTO-ENTMCNC: 34.6 G/DL (ref 31.5–36.5)
MCV RBC AUTO: 90 FL (ref 78–100)
PLATELET # BLD AUTO: 266 10E3/UL (ref 150–450)
RBC # BLD AUTO: 4.3 10E6/UL (ref 3.8–5.2)
WBC # BLD AUTO: 8 10E3/UL (ref 4–11)

## 2023-11-09 PROCEDURE — 82040 ASSAY OF SERUM ALBUMIN: CPT

## 2023-11-09 PROCEDURE — 36415 COLL VENOUS BLD VENIPUNCTURE: CPT

## 2023-11-09 PROCEDURE — 85027 COMPLETE CBC AUTOMATED: CPT

## 2023-11-09 PROCEDURE — 84460 ALANINE AMINO (ALT) (SGPT): CPT

## 2023-11-09 PROCEDURE — 82565 ASSAY OF CREATININE: CPT

## 2023-12-06 ENCOUNTER — PHARMACY VISIT (OUTPATIENT)
Dept: ADMINISTRATIVE | Facility: CLINIC | Age: 43
End: 2023-12-06
Payer: COMMERCIAL

## 2023-12-06 NOTE — PROGRESS NOTES
Psoriatic Arthritis Clinical Follow Up Assessment  Summary Notes  Spoke with pt today for TM assessment. Taltz 80mg every 4 weeks.   Taltz last filled 9/6/23. Refills were originally denied due to patient needing to be seen and needing labs. Pt had labs in Nov and were WNL. Provider was ok with pushing out follow up appt until Feb due to the patient doing well. Clinic sent in refills for Jonathan to get her through Feb until she is seen.   PsA: Set up order for Taltz today to del 12/12/23. Pt reports she is doing really well despite missing a few months doses. Denies questions or concerns today. Denies health, allergy or medication changes.   Ok for TM to follow up next quarter to offset clinic visit and invited pt to call sooner if questions or concerns arise.          Care Details    What are the patient's goals for this therapy?   ? 4/3/23 to have improved PsO control and not experience recurrent Staph infections      Is patient meeting treatment goals?   ? Progressing toward goal      Select medication prescribed:   ? TALTZ (ixekizumab)      Please enter patient's PDC. PDC= adherence metric. Goal >0.8 (80% adherence) [QA Metric]   ? 0.7      Based on the patient's report or refill record over the last 6-12 months, does the patient appear to be taking less than 80% of their medication? [QA Metric]   ? Yes    Document the reason patient is taking less than 80% of their medication.   ? Delayed by provider         Please select CURRENT side effect(s) for monitoring:   ? None       Cheryl Jeffery, Pharm.D.[place] [facility] Pharmacist  [address] [address]  [email or web address]  Office: [phone]

## 2024-03-07 ENCOUNTER — TELEPHONE (OUTPATIENT)
Dept: RHEUMATOLOGY | Facility: CLINIC | Age: 44
End: 2024-03-07
Payer: COMMERCIAL

## 2024-03-07 NOTE — TELEPHONE ENCOUNTER
PA Initiation    Medication: TALTZ 80 MG/ML SC SOAJ  Insurance Company: [employer] - Phone [phone] Fax [phone]  Pharmacy Filling the Rx: [facility] - [place], [place] - [facility] [address]  Filling Pharmacy Phone:    Filling Pharmacy Fax:    Start Date: 3/7/2024     [id number])    HÉCTOR Farrar, [facility]  Specialty Pharmacy Clinic Liaison     [facility]    [email or web address]     Phone: [phone]  Fax: [phone]

## 2024-03-08 NOTE — TELEPHONE ENCOUNTER
Prior Authorization Approval    Medication: TALTZ 80 MG/ML SC SOAJ  Authorization Effective Date: 3/8/2024  Authorization Expiration Date: 3/7/2025  Approved Dose/Quantity: 1  Reference #: [id number]   Insurance Company: [employer] - Phone [phone] Fax [phone]  Expected CoPay: $    CoPay Card Available: No    Financial Assistance Needed: NA  Which Pharmacy is filling the prescription: [facility] - [place] [address], [facility]  Specialty Pharmacy Clinic Liaison     [facility]    [email or web address]     Phone: [phone]  Fax: [phone]

## 2024-05-01 ENCOUNTER — PHARMACY VISIT (OUTPATIENT)
Dept: ADMINISTRATIVE | Facility: CLINIC | Age: 44
End: 2024-05-01
Payer: COMMERCIAL

## 2024-05-01 NOTE — PROGRESS NOTES
Psoriatic Arthritis Clinical Follow Up Assessment  Summary Notes    [facility] Therapy Management team will no longer be following.     Current Regimen: Taltz 80 mg every 4 weeks.     Patient prefers to trial going MONTHS between Taltz doses. Fill Hx: 9/6/23, 12/11/23, 3/20/24. PDC = 0.46.         Care Details    What are the patient's goals for this therapy?   ? 4/3/23 to have improved PsO control and not experience recurrent Staph infections      Is patient meeting treatment goals?   ? Yes      Patient's PDC. PDC= adherence metric. Goal >0.8 (80% adherence)    ? 0.46      Based on the patient's report or refill record over the last 6-12 months, does the patient appear to be taking less than 80% of their medication? [QA Metric]   ? Yes    Document the reason patient is taking less than 80% of their medication.   ? Patient purposefully taking less and provider aware        Valerie Gutierrez PharmD  Therapy Management Pharmacist  [facility]  [email or web address]  Specialty: [phone]

## 2024-05-22 ENCOUNTER — LAB REQUISITION (OUTPATIENT)
Dept: LAB | Facility: CLINIC | Age: 44
End: 2024-05-22

## 2024-05-22 DIAGNOSIS — Z86.14 PERSONAL HISTORY OF METHICILLIN RESISTANT STAPHYLOCOCCUS AUREUS INFECTION: ICD-10-CM

## 2024-05-22 PROCEDURE — 87081 CULTURE SCREEN ONLY: CPT | Performed by: PHYSICIAN ASSISTANT

## 2024-05-24 LAB — BACTERIA SPEC CULT: NORMAL

## 2024-07-21 ENCOUNTER — HEALTH MAINTENANCE LETTER (OUTPATIENT)
Age: 44
End: 2024-07-21

## 2024-08-01 ENCOUNTER — TELEPHONE (OUTPATIENT)
Dept: RHEUMATOLOGY | Facility: CLINIC | Age: 44
End: 2024-08-01
Payer: COMMERCIAL

## 2024-08-01 DIAGNOSIS — L40.50 PSORIATIC ARTHROPATHY (H): ICD-10-CM

## 2024-08-01 DIAGNOSIS — M46.1 BILATERAL SACROILIITIS (H): ICD-10-CM

## 2024-08-01 DIAGNOSIS — L40.9 PSORIASIS: ICD-10-CM

## 2024-08-02 NOTE — TELEPHONE ENCOUNTER
[facility] message[facility]  Pt needs an appt with labs prior for refills. If the pt schedule I will place lab orders.

## 2024-08-09 RX ORDER — IXEKIZUMAB 80 MG/ML
INJECTION, SOLUTION SUBCUTANEOUS
Qty: 1 ML | Refills: 2 | OUTPATIENT
Start: 2024-08-09

## 2024-09-13 ENCOUNTER — TELEPHONE (OUTPATIENT)
Dept: RHEUMATOLOGY | Facility: CLINIC | Age: 44
End: 2024-09-13
Payer: COMMERCIAL

## 2024-09-13 DIAGNOSIS — L40.50 PSORIATIC ARTHROPATHY (H): Primary | ICD-10-CM

## 2024-09-13 NOTE — TELEPHONE ENCOUNTER
Hello,    [facility] has been unable to reach this patient to refill their medication.  According to our records, the patient is overdue to refill and adherence may be an issue at this point.    Medication and strength: Taltz 80mg/ ml Auto Injector   Last fill date and day supply: 07/18/2024 28 day supply    If the medication is on hold, been changed, discontinued, sent to a different pharmacy, or any other information is available, please reply or contact us at [phone].    Thank you,    [facility]

## 2024-09-27 ENCOUNTER — APPOINTMENT (OUTPATIENT)
Dept: LAB | Facility: CLINIC | Age: 44
End: 2024-09-27
Payer: COMMERCIAL

## 2024-10-29 ENCOUNTER — LAB (OUTPATIENT)
Dept: LAB | Facility: CLINIC | Age: 44
End: 2024-10-29
Payer: COMMERCIAL

## 2024-10-29 DIAGNOSIS — L40.50 PSORIATIC ARTHROPATHY (H): ICD-10-CM

## 2024-10-29 LAB
ERYTHROCYTE [DISTWIDTH] IN BLOOD BY AUTOMATED COUNT: 12.7 % (ref 10–15)
HCT VFR BLD AUTO: 38.6 % (ref 35–47)
HGB BLD-MCNC: 13 G/DL (ref 11.7–15.7)
MCH RBC QN AUTO: 29.7 PG (ref 26.5–33)
MCHC RBC AUTO-ENTMCNC: 33.7 G/DL (ref 31.5–36.5)
MCV RBC AUTO: 88 FL (ref 78–100)
PLATELET # BLD AUTO: 272 10E3/UL (ref 150–450)
RBC # BLD AUTO: 4.38 10E6/UL (ref 3.8–5.2)
WBC # BLD AUTO: 7.6 10E3/UL (ref 4–11)

## 2024-10-29 PROCEDURE — 36415 COLL VENOUS BLD VENIPUNCTURE: CPT

## 2024-10-29 PROCEDURE — 84460 ALANINE AMINO (ALT) (SGPT): CPT

## 2024-10-29 PROCEDURE — 85027 COMPLETE CBC AUTOMATED: CPT

## 2024-10-29 PROCEDURE — 82565 ASSAY OF CREATININE: CPT

## 2024-10-29 PROCEDURE — 82040 ASSAY OF SERUM ALBUMIN: CPT

## 2024-10-30 LAB
ALBUMIN SERPL BCG-MCNC: 4.1 G/DL (ref 3.5–5.2)
ALT SERPL W P-5'-P-CCNC: 21 U/L (ref 0–50)
CREAT SERPL-MCNC: 0.67 MG/DL (ref 0.51–0.95)
EGFRCR SERPLBLD CKD-EPI 2021: >90 ML/MIN/1.73M2

## 2024-11-11 ENCOUNTER — VIRTUAL VISIT (OUTPATIENT)
Dept: RHEUMATOLOGY | Facility: CLINIC | Age: 44
End: 2024-11-11
Payer: COMMERCIAL

## 2024-11-11 DIAGNOSIS — Z79.899 HIGH RISK MEDICATION USE: ICD-10-CM

## 2024-11-11 DIAGNOSIS — L40.50 PSORIATIC ARTHROPATHY (H): Primary | ICD-10-CM

## 2024-11-11 DIAGNOSIS — L40.9 PSORIASIS: ICD-10-CM

## 2024-11-11 DIAGNOSIS — M46.1 BILATERAL SACROILIITIS (H): ICD-10-CM

## 2024-11-11 PROCEDURE — 99214 OFFICE O/P EST MOD 30 MIN: CPT | Mod: 95 | Performed by: INTERNAL MEDICINE

## 2024-11-11 PROCEDURE — G2211 COMPLEX E/M VISIT ADD ON: HCPCS | Mod: 95 | Performed by: INTERNAL MEDICINE

## 2024-11-11 RX ORDER — IXEKIZUMAB 80 MG/ML
INJECTION, SOLUTION SUBCUTANEOUS
Qty: 1 ML | Refills: 2 | Status: SHIPPED | OUTPATIENT
Start: 2024-11-11

## 2024-11-11 RX ORDER — MUPIROCIN 20 MG/G
OINTMENT TOPICAL 2 TIMES DAILY
COMMUNITY
Start: 2024-09-13

## 2024-11-11 RX ORDER — CLOBETASOL PROPIONATE 0.5 MG/G
OINTMENT TOPICAL 2 TIMES DAILY
COMMUNITY
Start: 2024-02-14

## 2024-11-11 RX ORDER — PREDNISONE 20 MG/1
TABLET ORAL
Qty: 3 TABLET | Refills: 0 | Status: SHIPPED | OUTPATIENT
Start: 2024-11-11

## 2024-11-11 NOTE — PROGRESS NOTES
Virtual Visit Details    Type of service:  Video Visit     Originating Location (pt. Location): Home    Distant Location (provider location):  On-site  Platform used for Video Visit: melissa Doss    This document was created using a software with less than 100% fidelity, at times resulting in unintended, even erroneous syntax and grammar.  The reader is advised to keep this under consideration while reviewing, interpreting this note.      Joined the call at 11/11/2024, 10:32:05 am.  Left the call at 11/11/2024, 10:42:37 am.  You were on the call for 10 minutes 31 seconds .     ASSESSMENT AND PLAN:    Diagnoses and all orders for this visit:  Psoriatic arthropathy (H)  -     predniSONE (DELTASONE) 20 MG tablet; Take 20mg po six hours prior to administering Taltz injection  -     ixekizumab (TALTZ) 80 MG/ML SOSY prefilled syringe; Inject 80 mg subcutaneously every 28 days.  -     Med Therapy Management Referral  Psoriasis  -     predniSONE (DELTASONE) 20 MG tablet; Take 20mg po six hours prior to administering Taltz injection  -     ixekizumab (TALTZ) 80 MG/ML SOSY prefilled syringe; Inject 80 mg subcutaneously every 28 days.  Bilateral sacroiliitis (H)  -     ixekizumab (TALTZ) 80 MG/ML SOSY prefilled syringe; Inject 80 mg subcutaneously every 28 days.  High risk medication use  -     Med Therapy Management Referral      Follow up in 1 year      HISTORY OF PRESENTING ILLNESS:  Sada Winter 44 year old is evaluated here via video/audio link.  This is for psoriatic arthritis, sacroiliitis with ankylosis, and lumbar spondylosis.  She has psoriasis.  She was last seen here in April 2023.  Over time she has found that she needs infrequent injections of Toradol.  This is no more than once every 4 months or so.  She decide on the timing based on when she begins to get discomfort in her lower back at nighttime.  She gets local reactions to Toltz injection that has responded nicely to 20 mg of prednisone taken 6 hours prior.  Her  psoriasis has remained under good control as well.  Other than that time she has no pain.  At that time she had been on Humira which was on the best interventions for her psoriatic arthritis.  Prior to that Stelara had been good for her skin but not for the joints and prior to that Enbrel had been good for both.  She had not been on methotrexate at any point and part because of the reproductive aspects.  She had not had IL-17 eleva anything tors.  While she was on Humira she did have superficial staph infections requiring antibiotics on a fairly regular basis otherwise she had tolerated it well.  In part because of her frustrating experience with her pharmacy she stopped taking Humira and had remained well during the interim between March 21 and now.  A few days ago she started experiencing pain in her lower back.  This has been quite severe pain interfering with day-to-day activities this is on the left side.  With some radiation down to the mid thigh hamstring area.  She has not had radicular symptoms.  No other joint areas of been affected.  After having had a lot more pain during the past several days today she has felt somewhat better.    She continues to work with the [employer] office, [employer], mostly from home.      ROS enquiry held for fever, ocular symptoms, rash, headache,  GI issues.  Today we also discussed the issues related to the current pandemic, the pros and cons of the current treatment plan, the CDC guidelines such as social distancing washing the hands covering the cough.  ALLERGIES:Sulfa (sulfonamide antibiotics) [sulfa antibiotics]    PAST MEDICAL/ACTIVE PROBLEMS/MEDICATION/SOCIAL DATA  No past medical history on file.  History   Smoking Status    Former   Smokeless Tobacco    Never     Patient Active Problem List   Diagnosis    Psoriatic arthropathy (H)    Bilateral sacroiliitis (H)    Psoriasis    High risk medication use     Current Outpatient Medications   Medication Sig Dispense Refill     acetaminophen (TYLENOL) 325 MG tablet [ACETAMINOPHEN (TYLENOL) 325 MG TABLET] Take 650 mg by mouth every 6 (six) hours as needed for pain. (Patient not taking: Reported on 4/5/2023)      ibuprofen (ADVIL,MOTRIN) 200 MG tablet [IBUPROFEN (ADVIL,MOTRIN) 200 MG TABLET] Take 200 mg by mouth every 6 (six) hours as needed for pain. (Patient not taking: Reported on 4/5/2023)      ixekizumab (TALTZ) 80 MG/ML SOSY prefilled syringe Inject 80 mg subcutaneously every 28 days. 1 mL 2    magnesium 30 mg tablet [MAGNESIUM 30 MG TABLET] Take 30 mg by mouth 2 (two) times a day.      naproxen (NAPROSYN) 500 MG tablet [NAPROXEN (NAPROSYN) 500 MG TABLET] TAKE 1 TABLET BY MOUTH TWICE A DAY WITH MEALS (Patient not taking: Reported on 12/13/2022) 60 tablet 1    predniSONE (DELTASONE) 20 MG tablet Take 20mg po six hours prior to administering Taltz injection 4 tablet 0    Turmeric 500 MG CAPS Take 3 tablet every day. (Patient not taking: Reported on 4/5/2023)      UNABLE TO FIND MEDICATION NAME: CBD oil           EXAMINATION:    Using the audio and video link as best as possible the constitutional, neck, neurologic, psych, skin, both upper extremities areas/organ system were evaluated during this assessment.  Some of the important findings: Alert, oriented, speech fluent.   Able to fully flex the digits, into fists bilaterally, wrist and elbow range of motion appear normal, abduction of the shoulder is normal.  No dactylitis of digits.      LAB / IMAGING DATA:  ALT   Date Value Ref Range Status   10/29/2024 21 0 - 50 U/L Final   11/09/2023 23 0 - 50 U/L Final     Comment:     Reference intervals for this test were updated on 6/12/2023 to more accurately reflect our healthy population. There may be differences in the flagging of prior results with similar values performed with this method. Interpretation of those prior results can be made in the context of the updated reference intervals.     04/05/2023 23 10 - 35 U/L Final     Albumin  "  Date Value Ref Range Status   10/29/2024 4.1 3.5 - 5.2 g/dL Final   11/09/2023 4.1 3.5 - 5.2 g/dL Final   04/05/2023 4.1 3.5 - 5.2 g/dL Final   10/28/2021 3.9 3.5 - 5.0 g/dL Final   03/11/2021 3.9 3.5 - 5.0 g/dL Final   08/05/2020 3.8 3.5 - 5.0 g/dL Final       WBC Count   Date Value Ref Range Status   10/29/2024 7.6 4.0 - 11.0 10e3/uL Final   11/09/2023 8.0 4.0 - 11.0 10e3/uL Final     Hemoglobin   Date Value Ref Range Status   10/29/2024 13.0 11.7 - 15.7 g/dL Final   11/09/2023 13.4 11.7 - 15.7 g/dL Final   04/05/2023 13.0 11.7 - 15.7 g/dL Final     Platelet Count   Date Value Ref Range Status   10/29/2024 272 150 - 450 10e3/uL Final   11/09/2023 266 150 - 450 10e3/uL Final   04/05/2023 272 150 - 450 10e3/uL Final       No results found for: \"LAVONNE\"    "

## 2024-11-15 ENCOUNTER — TELEPHONE (OUTPATIENT)
Dept: RHEUMATOLOGY | Facility: CLINIC | Age: 44
End: 2024-11-15
Payer: COMMERCIAL

## 2024-11-15 NOTE — TELEPHONE ENCOUNTER
MTM referral from: [facility] visit (referral by provider)    MTM referral outreach attempt #2 on November 15, 2024 at 10:11 AM      Outcome: Patient not reachable after several attempts, routed to Pharmacist Team/Provider as an FYI    Use hbc for the carrier/Plan on the flowsheet      Meeting To You Message Sent    Nicolasa Adamson CPhT  MTM

## 2024-11-21 NOTE — TELEPHONE ENCOUNTER
MTM Referral outreach attempt #3 was made on 11/21/2024.    Outcome: Sent patient MyChart message explaining more in-depth what MTM is and how we can best support them. Attached ability to schedule from message, as well as offered opportunity to call me directly for help with scheduling.

## 2025-02-26 ENCOUNTER — TELEPHONE (OUTPATIENT)
Dept: RHEUMATOLOGY | Facility: CLINIC | Age: 45
End: 2025-02-26
Payer: COMMERCIAL

## 2025-02-26 NOTE — TELEPHONE ENCOUNTER
PA Needed    Medication: taltz  QTY/DS: 1 per 28 ds  NEW INS: yes  Insurance Company:  [employer]  Pharmacy Filling the Rx:  [facility] :  new ins  Date of last fill:

## 2025-02-27 NOTE — TELEPHONE ENCOUNTER
PA Initiation    Medication: TALTZ 80 MG/ML SC SOAJ  Insurance Company: [employer] - Phone [phone] Fax [phone]  Ph[facility] Filling the Rx: [facility] - [address] [place] [address]  Filling Pharmacy Phone:    Filling Pharmacy Fax:    Start Date: 2/27/2025    [id number]

## 2025-03-04 ENCOUNTER — MYC MEDICAL ADVICE (OUTPATIENT)
Dept: RHEUMATOLOGY | Facility: CLINIC | Age: 45
End: 2025-03-04
Payer: COMMERCIAL

## 2025-03-04 DIAGNOSIS — M46.1 BILATERAL SACROILIITIS: ICD-10-CM

## 2025-03-04 DIAGNOSIS — L40.9 PSORIASIS: ICD-10-CM

## 2025-03-04 DIAGNOSIS — L40.50 PSORIATIC ARTHROPATHY (H): ICD-10-CM

## 2025-03-05 NOTE — TELEPHONE ENCOUNTER
[place] pharmacy tech with [facility] calling to see if prescription can be sent to them at Fax #: [phone]. Call back is [phone] for any questions. Thanks.

## 2025-03-06 RX ORDER — IXEKIZUMAB 80 MG/ML
INJECTION, SOLUTION SUBCUTANEOUS
Qty: 1 ML | Refills: 2 | Status: SHIPPED | OUTPATIENT
Start: 2025-03-06

## 2025-03-06 NOTE — TELEPHONE ENCOUNTER
Prior Authorization Approval    Medication: TALTZ 80 MG/ML SC SOAJ  Authorization Effective Date: 3/6/2025  Authorization Expiration Date: 2/27/2026  Approved Dose/Quantity: 1  Reference #: [id number]   Insurance Company: [employer] - Phone [phone] Fax [phone]  Expected CoPay: $    CoPay Card Available: No    Financial Assistance Needed: no  Which Pharmacy is filling the prescription: [facility] - [address] [place] [address]  Pharmacy Notified: yes  Patient Notified: yes

## 2025-03-12 ENCOUNTER — TELEPHONE (OUTPATIENT)
Dept: RHEUMATOLOGY | Facility: CLINIC | Age: 45
End: 2025-03-12
Payer: COMMERCIAL

## 2025-03-12 NOTE — TELEPHONE ENCOUNTER
Spoke with pharmacist at [facility] and verified patient has been on therapy and completed loading dose approximately 12/2022-1/2023.    Kash Bowman, PharmD  Medication Therapy Management Pharmacist  [facility]  Phone: [phone]

## 2025-03-12 NOTE — TELEPHONE ENCOUNTER
Health Call Center    Phone Message    May a detailed message be left on voicemail: yes     Reason for Call: Medication Question or concern regarding medication   Prescription Clarification  Name of Medication: ixekizumab (TALTZ) 80 MG/ML SOSY prefilled syringe   Prescribing Provider: Dr Melo   Pharmacy: [address]    What on the order needs clarification? Pharmacy is calling to clarify if pt has completed the loading doses yet.     Reference number: [id number]    Action Taken: Message routed to:  Other: MATHIEU Rheum    Travel Screening: Not Applicable     Date of Service: 3/12

## 2025-03-24 ENCOUNTER — TELEPHONE (OUTPATIENT)
Dept: RHEUMATOLOGY | Facility: CLINIC | Age: 45
End: 2025-03-24
Payer: COMMERCIAL

## 2025-03-24 NOTE — TELEPHONE ENCOUNTER
Prior Authorization Not Needed per Insurance    Medication: TALTZ 80 MG/ML SC SOAJ  Insurance Company: [employer] - Phone [phone] Fax [phone]  Expected CoPay: $    Pharmacy Filling the Rx: [facility] - [address] [facility]  Pharmacy Notified: no  Patient Notified: no

## 2025-08-10 ENCOUNTER — HEALTH MAINTENANCE LETTER (OUTPATIENT)
Age: 45
End: 2025-08-10

## 2025-08-31 ENCOUNTER — HEALTH MAINTENANCE LETTER (OUTPATIENT)
Age: 45
End: 2025-08-31